# Patient Record
Sex: MALE | Race: WHITE | NOT HISPANIC OR LATINO | Employment: OTHER | URBAN - METROPOLITAN AREA
[De-identification: names, ages, dates, MRNs, and addresses within clinical notes are randomized per-mention and may not be internally consistent; named-entity substitution may affect disease eponyms.]

---

## 2017-01-18 ENCOUNTER — OFFICE VISIT (OUTPATIENT)
Dept: INTERNAL MEDICINE | Facility: CLINIC | Age: 57
End: 2017-01-18

## 2017-01-18 VITALS
HEIGHT: 72 IN | SYSTOLIC BLOOD PRESSURE: 130 MMHG | BODY MASS INDEX: 31.42 KG/M2 | HEART RATE: 68 BPM | DIASTOLIC BLOOD PRESSURE: 76 MMHG | WEIGHT: 232 LBS

## 2017-01-18 DIAGNOSIS — E55.9 VITAMIN D DEFICIENCY: ICD-10-CM

## 2017-01-18 DIAGNOSIS — Z99.89 OSA ON CPAP: ICD-10-CM

## 2017-01-18 DIAGNOSIS — J30.1 SEASONAL ALLERGIC RHINITIS DUE TO POLLEN: ICD-10-CM

## 2017-01-18 DIAGNOSIS — G47.33 OSA ON CPAP: ICD-10-CM

## 2017-01-18 DIAGNOSIS — E78.49 OTHER HYPERLIPIDEMIA: Primary | ICD-10-CM

## 2017-01-18 DIAGNOSIS — M19.90 ARTHRITIS: ICD-10-CM

## 2017-01-18 LAB
25(OH)D3 SERPL-MCNC: 30.2 NG/ML
ALBUMIN SERPL-MCNC: 4.6 G/DL (ref 3.2–4.8)
ALBUMIN/GLOB SERPL: 1.7 G/DL (ref 1.5–2.5)
ALP SERPL-CCNC: 68 U/L (ref 25–100)
ALT SERPL W P-5'-P-CCNC: 34 U/L (ref 7–40)
ANION GAP SERPL CALCULATED.3IONS-SCNC: 6 MMOL/L (ref 3–11)
ARTICHOKE IGE QN: 120 MG/DL (ref 0–130)
AST SERPL-CCNC: 27 U/L (ref 0–33)
BILIRUB SERPL-MCNC: 0.8 MG/DL (ref 0.3–1.2)
BUN BLD-MCNC: 16 MG/DL (ref 9–23)
BUN/CREAT SERPL: 20 (ref 7–25)
CALCIUM SPEC-SCNC: 10.2 MG/DL (ref 8.7–10.4)
CHLORIDE SERPL-SCNC: 105 MMOL/L (ref 99–109)
CHOLEST SERPL-MCNC: 202 MG/DL (ref 0–200)
CO2 SERPL-SCNC: 34 MMOL/L (ref 20–31)
CREAT BLD-MCNC: 0.8 MG/DL (ref 0.6–1.3)
GFR SERPL CREATININE-BSD FRML MDRD: 101 ML/MIN/1.73
GLOBULIN UR ELPH-MCNC: 2.7 GM/DL
GLUCOSE BLD-MCNC: 107 MG/DL (ref 70–100)
HDLC SERPL-MCNC: 42 MG/DL (ref 40–60)
POTASSIUM BLD-SCNC: 4.9 MMOL/L (ref 3.5–5.5)
PROT SERPL-MCNC: 7.3 G/DL (ref 5.7–8.2)
SODIUM BLD-SCNC: 145 MMOL/L (ref 132–146)
TRIGL SERPL-MCNC: 203 MG/DL (ref 0–150)

## 2017-01-18 PROCEDURE — 80061 LIPID PANEL: CPT | Performed by: INTERNAL MEDICINE

## 2017-01-18 PROCEDURE — 82306 VITAMIN D 25 HYDROXY: CPT | Performed by: INTERNAL MEDICINE

## 2017-01-18 PROCEDURE — 80053 COMPREHEN METABOLIC PANEL: CPT | Performed by: INTERNAL MEDICINE

## 2017-01-18 PROCEDURE — 36415 COLL VENOUS BLD VENIPUNCTURE: CPT | Performed by: INTERNAL MEDICINE

## 2017-01-18 PROCEDURE — 99213 OFFICE O/P EST LOW 20 MIN: CPT | Performed by: INTERNAL MEDICINE

## 2017-01-18 NOTE — MR AVS SNAPSHOT
Glen Sousa   2017 7:45 AM   Office Visit    Dept Phone:  [phone]   Encounter #:  [id number]    Provider:  Ender Ibrahim MD   Department:  [facility]                Your Full Care Plan              Your Updated Medication List          This list is accurate as of: 17  8:05 AM.  Always use your most recent med list.                atorvastatin 10 MG tablet   Commonly known as:  LIPITOR       CIALIS 5 MG tablet   Generic drug:  tadalafil   TAKE AS DIRECTED.               We Performed the Following     Comprehensive Metabolic Panel     Lipid Panel     Vitamin D 25 Hydroxy       You Were Diagnosed With        Codes Comments    Other hyperlipidemia    -  Primary ICD-10-CM: E78.4  ICD-9-CM: 272.4     Vitamin D deficiency     ICD-10-CM: E55.9  ICD-9-CM: 268.9     FARIDEH on CPAP     ICD-10-CM: G47.33  ICD-9-CM: 327.23     Arthritis     ICD-10-CM: M19.90  ICD-9-CM: 716.90     Seasonal allergic rhinitis due to pollen     ICD-10-CM: J30.1  ICD-9-CM: 477.0       Instructions     None    Patient Instructions History      Upcoming Appointments     Visit Type Date Time Department    FOLLOW UP 2017  7:45 AM [facility] yoonew Signup     [facility] [employer] allows you to send messages to your doctor, view your test results, renew your prescriptions, schedule appointments, and more. To sign up, go to [employer] and click on the Sign Up Now link in the New User? box. Enter your [employer] Activation Code exactly as it appears below along with the last four digits of your Social Security Number and your Date of Birth () to complete the sign-up process. If you do not sign up before the expiration date, you must request a new code.    [employer] Activation Code: [email or web address]  Expires: 2017  8:05 AM    If you have questions, you can email [email or web address] or call [phone] to talk to our [employer] staff. Remember,  "MyChart is NOT to be used for urgent needs. For medical emergencies, dial 911.               Other Info from Your Visit           Allergies     Niacin      Niacin And Related        Reason for Visit     Hyperlipidemia           Vital Signs     Blood Pressure Pulse Height Weight Body Mass Index Smoking Status    130/76 68 72\" (182.9 cm) 232 lb (105 kg) 31.46 kg/m2 Never Smoker      Problems and Diagnoses Noted     Nasal inflammation due to allergen    Arthritis    High cholesterol or triglycerides    Sleep apnea    Vitamin D deficiency        "

## 2017-01-18 NOTE — PROGRESS NOTES
Patient is a 56 y.o. male who is here for a follow up of chronic condition.  Chief Complaint   Patient presents with   • Hyperlipidemia         HPI:  Here for f/u.  Doing ok. Has diffuse arthritic complaints.  Compliant with cpap.  Allergies are not bothersome.  No abdominal pains.    History:    Patient Active Problem List   Diagnosis   • Hyperlipidemia   • Male erectile disorder   • FARIDEH on CPAP   • Allergic rhinitis   • Arthritis   • Vitamin D deficiency   • Hyperlipoproteinemia type IV   • Routine general medical examination at a health care facility       Past Medical History   Diagnosis Date   • Acute sinusitis      Getting over sinus infection. Symptoms better with augmentin.   • Elbow enthesopathy    • Hyperlipoproteinemia type IV    • Overweight    • Plantar fasciitis    • Vitamin D deficiency        Past Surgical History   Procedure Laterality Date   • Lasik         Current Outpatient Prescriptions on File Prior to Visit   Medication Sig   • atorvastatin (LIPITOR) 10 MG tablet every night.   • CIALIS 5 MG tablet TAKE AS DIRECTED.     No current facility-administered medications on file prior to visit.        Family History   Problem Relation Age of Onset   • Diabetes Other    • Hypertension Other    • Hyperlipidemia Other    • Cancer Other    • Stroke Other    • Arthritis Other    • Graves' disease Other    • Diverticulosis Other      of intestine   • Breast cancer Other        Social History     Social History   • Marital status:      Spouse name: N/A   • Number of children: N/A   • Years of education: N/A     Occupational History   • Not on file.     Social History Main Topics   • Smoking status: Never Smoker   • Smokeless tobacco: Not on file   • Alcohol use Yes      Comment:  · 1 beer weekly   • Drug use: Not on file   • Sexual activity: Not on file     Other Topics Concern   • Not on file     Social History Narrative         ROS:    Review of Systems   Constitutional: Negative for chills,  "fatigue, fever and unexpected weight change.   HENT: Negative for congestion, ear pain, hearing loss, rhinorrhea, sinus pressure, sore throat and trouble swallowing.    Eyes: Negative for discharge and itching.   Respiratory: Negative for cough, chest tightness and shortness of breath.    Cardiovascular: Negative for chest pain, palpitations and leg swelling.   Gastrointestinal: Negative for abdominal pain, blood in stool, constipation, diarrhea and vomiting.        12/13 colonoscopy normal   Endocrine: Negative for polydipsia and polyuria.   Genitourinary: Negative for difficulty urinating, dysuria, enuresis, frequency, hematuria and urgency.        7/16 psa   Musculoskeletal: Positive for arthralgias and back pain. Negative for gait problem and joint swelling.   Skin: Negative for rash and wound.   Allergic/Immunologic: Negative for immunocompromised state.   Neurological: Negative for dizziness, syncope, weakness, light-headedness, numbness and headaches.   Hematological: Does not bruise/bleed easily.   Psychiatric/Behavioral: Negative for behavioral problems, dysphoric mood and sleep disturbance. The patient is not nervous/anxious.        Visit Vitals   • /76   • Pulse 68   • Ht 72\" (182.9 cm)   • Wt 232 lb (105 kg)   • BMI 31.46 kg/m2       Physical Exam:    Physical Exam   Constitutional: He is oriented to person, place, and time. He appears well-developed and well-nourished.   HENT:   Head: Normocephalic and atraumatic.   Right Ear: External ear normal.   Left Ear: External ear normal.   Mouth/Throat: Oropharynx is clear and moist.   Eyes: Conjunctivae and EOM are normal.   Neck: Normal range of motion. Neck supple.   Cardiovascular: Normal rate, regular rhythm and normal heart sounds.    Pulmonary/Chest: Effort normal and breath sounds normal.   Abdominal: Soft. Bowel sounds are normal.   Musculoskeletal: Normal range of motion.   Lymphadenopathy:     He has no cervical adenopathy.   Neurological: He " is alert and oriented to person, place, and time.   Skin: Skin is warm and dry.   Psychiatric: He has a normal mood and affect. His behavior is normal. Thought content normal.       Procedure:      Discussion/Summary:  hyperlipidemia-labs today  tara-cont cpap  rhinitis-advised compliance with flonase  ED-cialis rx prn  Vit d def-recheck  high risk meds-lft today     labs noted and dw patient  , counseled on low carb and ncs      Current Outpatient Prescriptions:   •  atorvastatin (LIPITOR) 10 MG tablet, every night., Disp: , Rfl:   •  CIALIS 5 MG tablet, TAKE AS DIRECTED., Disp: 30 tablet, Rfl: 5        Glen was seen today for hyperlipidemia.    Diagnoses and all orders for this visit:    Other hyperlipidemia  -     Comprehensive Metabolic Panel  -     Lipid Panel    Vitamin D deficiency  -     Vitamin D 25 Hydroxy    TARA on CPAP    Arthritis    Seasonal allergic rhinitis due to pollen

## 2017-03-03 RX ORDER — ATORVASTATIN CALCIUM 10 MG/1
TABLET, FILM COATED ORAL
Qty: 30 TABLET | Refills: 5 | Status: SHIPPED | OUTPATIENT
Start: 2017-03-03 | End: 2017-05-18 | Stop reason: SDUPTHER

## 2017-05-10 RX ORDER — AZITHROMYCIN 250 MG/1
TABLET, FILM COATED ORAL
Qty: 6 TABLET | Refills: 0 | Status: SHIPPED | OUTPATIENT
Start: 2017-05-10 | End: 2017-06-01

## 2017-05-18 RX ORDER — ATORVASTATIN CALCIUM 10 MG/1
10 TABLET, FILM COATED ORAL NIGHTLY
Qty: 90 TABLET | Refills: 1 | Status: SHIPPED | OUTPATIENT
Start: 2017-05-18 | End: 2017-07-18 | Stop reason: SDUPTHER

## 2017-06-01 ENCOUNTER — TELEPHONE (OUTPATIENT)
Dept: INTERNAL MEDICINE | Facility: CLINIC | Age: 57
End: 2017-06-01

## 2017-06-01 RX ORDER — CEFDINIR 300 MG/1
300 CAPSULE ORAL 2 TIMES DAILY
Qty: 20 CAPSULE | Refills: 0 | Status: SHIPPED | OUTPATIENT
Start: 2017-06-01 | End: 2017-07-18

## 2017-06-01 NOTE — TELEPHONE ENCOUNTER
----- Message from Ender Ibrahim MD sent at 6/1/2017  8:18 AM EDT -----  Does he need cxr??  ----- Message -----     From: Sailaja Rico MA     Sent: 6/1/2017   8:05 AM       To: Ender Ibrahim MD    We gave him a zpack on 5/10  ----- Message -----     From: Joanne Godwin     Sent: 6/1/2017   7:56 AM       To: Sailaja Rico MA    Call clarice at [phone]. He cant get well      Pt has sinus infection advised to use mucinex D and will call in antibiotc and to come in if not better with this round of meds

## 2017-07-18 ENCOUNTER — OFFICE VISIT (OUTPATIENT)
Dept: INTERNAL MEDICINE | Facility: CLINIC | Age: 57
End: 2017-07-18

## 2017-07-18 VITALS
HEART RATE: 68 BPM | SYSTOLIC BLOOD PRESSURE: 120 MMHG | DIASTOLIC BLOOD PRESSURE: 74 MMHG | WEIGHT: 233 LBS | BODY MASS INDEX: 31.56 KG/M2 | HEIGHT: 72 IN

## 2017-07-18 DIAGNOSIS — E55.9 VITAMIN D DEFICIENCY: ICD-10-CM

## 2017-07-18 DIAGNOSIS — J30.1 SEASONAL ALLERGIC RHINITIS DUE TO POLLEN: ICD-10-CM

## 2017-07-18 DIAGNOSIS — E78.49 OTHER HYPERLIPIDEMIA: Primary | ICD-10-CM

## 2017-07-18 DIAGNOSIS — Z99.89 OSA ON CPAP: ICD-10-CM

## 2017-07-18 DIAGNOSIS — Z12.5 SCREENING FOR PROSTATE CANCER: ICD-10-CM

## 2017-07-18 DIAGNOSIS — Z00.00 ROUTINE GENERAL MEDICAL EXAMINATION AT A HEALTH CARE FACILITY: ICD-10-CM

## 2017-07-18 DIAGNOSIS — Z12.11 SCREEN FOR COLON CANCER: ICD-10-CM

## 2017-07-18 DIAGNOSIS — M19.90 ARTHRITIS: ICD-10-CM

## 2017-07-18 DIAGNOSIS — G47.33 OSA ON CPAP: ICD-10-CM

## 2017-07-18 LAB
25(OH)D3 SERPL-MCNC: 17.5 NG/ML
ALBUMIN SERPL-MCNC: 4.5 G/DL (ref 3.2–4.8)
ALBUMIN/GLOB SERPL: 1.7 G/DL (ref 1.5–2.5)
ALP SERPL-CCNC: 67 U/L (ref 25–100)
ALT SERPL W P-5'-P-CCNC: 44 U/L (ref 7–40)
ANION GAP SERPL CALCULATED.3IONS-SCNC: 5 MMOL/L (ref 3–11)
ARTICHOKE IGE QN: 116 MG/DL (ref 0–130)
AST SERPL-CCNC: 29 U/L (ref 0–33)
BILIRUB BLD-MCNC: NEGATIVE MG/DL
BILIRUB SERPL-MCNC: 0.9 MG/DL (ref 0.3–1.2)
BUN BLD-MCNC: 14 MG/DL (ref 9–23)
BUN/CREAT SERPL: 17.5 (ref 7–25)
CALCIUM SPEC-SCNC: 9.8 MG/DL (ref 8.7–10.4)
CHLORIDE SERPL-SCNC: 107 MMOL/L (ref 99–109)
CHOLEST SERPL-MCNC: 217 MG/DL (ref 0–200)
CLARITY, POC: CLEAR
CO2 SERPL-SCNC: 27 MMOL/L (ref 20–31)
COLOR UR: YELLOW
CREAT BLD-MCNC: 0.8 MG/DL (ref 0.6–1.3)
DEPRECATED RDW RBC AUTO: 49 FL (ref 37–54)
DEVELOPER EXPIRATION DATE: NORMAL
DEVELOPER LOT NUMBER: NORMAL
ERYTHROCYTE [DISTWIDTH] IN BLOOD BY AUTOMATED COUNT: 13.8 % (ref 11.3–14.5)
EXPIRATION DATE: NORMAL
FECAL OCCULT BLOOD SCREEN, POC: NEGATIVE
GFR SERPL CREATININE-BSD FRML MDRD: 101 ML/MIN/1.73
GLOBULIN UR ELPH-MCNC: 2.6 GM/DL
GLUCOSE BLD-MCNC: 97 MG/DL (ref 70–100)
GLUCOSE UR STRIP-MCNC: NEGATIVE MG/DL
HCT VFR BLD AUTO: 48.7 % (ref 38.9–50.9)
HDLC SERPL-MCNC: 43 MG/DL (ref 40–60)
HGB BLD-MCNC: 16 G/DL (ref 13.1–17.5)
KETONES UR QL: NEGATIVE
LEUKOCYTE EST, POC: NEGATIVE
Lab: 5745
MCH RBC QN AUTO: 32.5 PG (ref 27–31)
MCHC RBC AUTO-ENTMCNC: 32.9 G/DL (ref 32–36)
MCV RBC AUTO: 98.8 FL (ref 80–99)
NEGATIVE CONTROL: NEGATIVE
NITRITE UR-MCNC: NEGATIVE MG/ML
PH UR: 5 [PH] (ref 5–8)
PLATELET # BLD AUTO: 182 10*3/MM3 (ref 150–450)
PMV BLD AUTO: 11.1 FL (ref 6–12)
POSITIVE CONTROL: POSITIVE
POTASSIUM BLD-SCNC: 4.2 MMOL/L (ref 3.5–5.5)
PROT SERPL-MCNC: 7.1 G/DL (ref 5.7–8.2)
PROT UR STRIP-MCNC: NEGATIVE MG/DL
PSA SERPL-MCNC: 2.05 NG/ML (ref 0–4)
RBC # BLD AUTO: 4.93 10*6/MM3 (ref 4.2–5.76)
RBC # UR STRIP: NEGATIVE /UL
SODIUM BLD-SCNC: 139 MMOL/L (ref 132–146)
SP GR UR: 1.02 (ref 1–1.03)
TRIGL SERPL-MCNC: 356 MG/DL (ref 0–150)
TSH SERPL DL<=0.05 MIU/L-ACNC: 4.19 MIU/ML (ref 0.35–5.35)
UROBILINOGEN UR QL: NORMAL
VIT B12 BLD-MCNC: 497 PG/ML (ref 211–911)
WBC NRBC COR # BLD: 6.4 10*3/MM3 (ref 3.5–10.8)

## 2017-07-18 PROCEDURE — 85027 COMPLETE CBC AUTOMATED: CPT | Performed by: INTERNAL MEDICINE

## 2017-07-18 PROCEDURE — 99396 PREV VISIT EST AGE 40-64: CPT | Performed by: INTERNAL MEDICINE

## 2017-07-18 PROCEDURE — 84153 ASSAY OF PSA TOTAL: CPT | Performed by: INTERNAL MEDICINE

## 2017-07-18 PROCEDURE — 80061 LIPID PANEL: CPT | Performed by: INTERNAL MEDICINE

## 2017-07-18 PROCEDURE — 81003 URINALYSIS AUTO W/O SCOPE: CPT | Performed by: INTERNAL MEDICINE

## 2017-07-18 PROCEDURE — 84443 ASSAY THYROID STIM HORMONE: CPT | Performed by: INTERNAL MEDICINE

## 2017-07-18 PROCEDURE — 82306 VITAMIN D 25 HYDROXY: CPT | Performed by: INTERNAL MEDICINE

## 2017-07-18 PROCEDURE — 82270 OCCULT BLOOD FECES: CPT | Performed by: INTERNAL MEDICINE

## 2017-07-18 PROCEDURE — 82607 VITAMIN B-12: CPT | Performed by: INTERNAL MEDICINE

## 2017-07-18 PROCEDURE — 80053 COMPREHEN METABOLIC PANEL: CPT | Performed by: INTERNAL MEDICINE

## 2017-07-18 RX ORDER — ATORVASTATIN CALCIUM 20 MG/1
20 TABLET, FILM COATED ORAL NIGHTLY
Qty: 90 TABLET | Refills: 3 | Status: SHIPPED | OUTPATIENT
Start: 2017-07-18 | End: 2018-07-11 | Stop reason: SDUPTHER

## 2017-07-18 NOTE — PROGRESS NOTES
Patient is a 56 y.o. male who is here for a physical.  Chief Complaint   Patient presents with   • Annual Exam         HPI:  Here for CPE.  Doing good.  Compliant with CPAP.  Getting about 8 hours per night.  Energy level is good.  No CV complaints.  No abdominal pains.  No edema.     History:    Patient Active Problem List   Diagnosis   • Hyperlipidemia   • Male erectile disorder   • FARIDEH on CPAP   • Allergic rhinitis   • Arthritis   • Vitamin D deficiency   • Routine general medical examination at a health care facility       Past Medical History:   Diagnosis Date   • Acute sinusitis     Getting over sinus infection. Symptoms better with augmentin.   • Elbow enthesopathy    • Hyperlipoproteinemia type IV    • Overweight    • Plantar fasciitis    • Vitamin D deficiency        Past Surgical History:   Procedure Laterality Date   • LASIK         Current Outpatient Prescriptions on File Prior to Visit   Medication Sig   • CIALIS 5 MG tablet TAKE AS DIRECTED.     No current facility-administered medications on file prior to visit.        Family History   Problem Relation Age of Onset   • Diabetes Other    • Hypertension Other    • Hyperlipidemia Other    • Cancer Other    • Stroke Other    • Arthritis Other    • Graves' disease Other    • Diverticulosis Other      of intestine   • Breast cancer Other        Social History     Social History   • Marital status:      Spouse name: N/A   • Number of children: N/A   • Years of education: N/A     Occupational History   • Not on file.     Social History Main Topics   • Smoking status: Never Smoker   • Smokeless tobacco: Not on file   • Alcohol use Yes      Comment:  · 1 beer weekly   • Drug use: Not on file   • Sexual activity: Not on file     Other Topics Concern   • Not on file     Social History Narrative         ROS:    Review of Systems   Constitutional: Negative for chills, fatigue, fever and unexpected weight change.   HENT: Negative for congestion, ear pain,  "hearing loss, rhinorrhea, sinus pressure, sore throat and trouble swallowing.    Eyes: Negative for discharge and itching.   Respiratory: Negative for cough, chest tightness and shortness of breath.    Cardiovascular: Negative for chest pain, palpitations and leg swelling.   Gastrointestinal: Negative for abdominal pain, blood in stool, constipation, diarrhea and vomiting.        12/13 colonoscopy normal   Endocrine: Negative for polydipsia and polyuria.   Genitourinary: Negative for difficulty urinating, dysuria, enuresis, frequency, hematuria and urgency.        7/17 psa   Musculoskeletal: Positive for arthralgias and back pain. Negative for gait problem and joint swelling.   Skin: Negative for rash and wound.   Allergic/Immunologic: Negative for immunocompromised state.   Neurological: Negative for dizziness, syncope, weakness, light-headedness, numbness and headaches.   Hematological: Does not bruise/bleed easily.   Psychiatric/Behavioral: Negative for behavioral problems, dysphoric mood and sleep disturbance. The patient is not nervous/anxious.        /74  Pulse 68  Ht 72\" (182.9 cm)  Wt 233 lb (106 kg)  BMI 31.6 kg/m2    Physical Exam:    Physical Exam   Constitutional: He is oriented to person, place, and time. He appears well-developed and well-nourished.   HENT:   Head: Normocephalic and atraumatic.   Right Ear: External ear normal.   Left Ear: External ear normal.   Mouth/Throat: Oropharynx is clear and moist.   Eyes: Conjunctivae and EOM are normal. Pupils are equal, round, and reactive to light.   Neck: Normal range of motion. Neck supple.   Cardiovascular: Normal rate, regular rhythm, normal heart sounds and intact distal pulses.    Pulmonary/Chest: Effort normal and breath sounds normal.   Abdominal: Soft. Bowel sounds are normal.   Genitourinary: Rectum normal and prostate normal. Rectal exam shows guaiac negative stool.   Musculoskeletal: Normal range of motion.   Neurological: He is alert " and oriented to person, place, and time. He has normal reflexes.   Skin: Skin is warm and dry.   Psychiatric: He has a normal mood and affect. His behavior is normal. Judgment and thought content normal.   Vitals reviewed.      Procedure:      Discussion/Summary:  hyperlipidemia-labs today  tara-cont cpap  rhinitis-advised compliance with flonase  ED-cialis rx prn  Vit d def-recheck  high risk meds-lft today  HME-fasting labs today, counseled on diet and exercise  Reviewed the following with the patient: advised patient to avoid alcoholic beverages, encouraged patient to exercise 5-7 days per week for 30 minutes at a time, ideal body weight discussed with patient and weight loss encouraged.        labs noted and dw patient , counseled on low carb and ncs, will increase the lipitor to 20 mg      Current Outpatient Prescriptions:   •  atorvastatin (LIPITOR) 20 MG tablet, Take 1 tablet by mouth Every Night. Needs fasting in 6-8 weeks, Disp: 90 tablet, Rfl: 3  •  CIALIS 5 MG tablet, TAKE AS DIRECTED., Disp: 30 tablet, Rfl: 5        Glen was seen today for annual exam.    Diagnoses and all orders for this visit:    Other hyperlipidemia  -     Lipid Panel    Seasonal allergic rhinitis due to pollen    TARA on CPAP    Vitamin D deficiency    Arthritis    Routine general medical examination at a health care facility  -     CBC (No Diff)  -     Comprehensive Metabolic Panel  -     Lipid Panel  -     PSA  -     TSH  -     Vitamin B12  -     Vitamin D 25 Hydroxy  -     POC Urinalysis Dipstick, Automated  -     POC Occult Blood Stool    Screening for prostate cancer  -     PSA    Screen for colon cancer  -     POC Occult Blood Stool    Other orders  -     atorvastatin (LIPITOR) 20 MG tablet; Take 1 tablet by mouth Every Night. Needs fasting in 6-8 weeks

## 2017-08-31 ENCOUNTER — LAB (OUTPATIENT)
Dept: INTERNAL MEDICINE | Facility: CLINIC | Age: 57
End: 2017-08-31

## 2017-08-31 DIAGNOSIS — E78.00 PURE HYPERCHOLESTEROLEMIA: Primary | ICD-10-CM

## 2017-08-31 LAB
ARTICHOKE IGE QN: 106 MG/DL (ref 0–130)
CHOLEST SERPL-MCNC: 175 MG/DL (ref 0–200)
HDLC SERPL-MCNC: 39 MG/DL (ref 40–60)
TRIGL SERPL-MCNC: 219 MG/DL (ref 0–150)

## 2017-08-31 PROCEDURE — 80061 LIPID PANEL: CPT | Performed by: INTERNAL MEDICINE

## 2018-01-19 ENCOUNTER — OFFICE VISIT (OUTPATIENT)
Dept: INTERNAL MEDICINE | Facility: CLINIC | Age: 58
End: 2018-01-19

## 2018-01-19 VITALS
HEART RATE: 68 BPM | SYSTOLIC BLOOD PRESSURE: 132 MMHG | DIASTOLIC BLOOD PRESSURE: 80 MMHG | BODY MASS INDEX: 32.23 KG/M2 | HEIGHT: 72 IN | WEIGHT: 238 LBS

## 2018-01-19 DIAGNOSIS — Z99.89 OSA ON CPAP: ICD-10-CM

## 2018-01-19 DIAGNOSIS — E78.00 PURE HYPERCHOLESTEROLEMIA: Primary | ICD-10-CM

## 2018-01-19 DIAGNOSIS — J30.1 SEASONAL ALLERGIC RHINITIS DUE TO POLLEN, UNSPECIFIED CHRONICITY: ICD-10-CM

## 2018-01-19 DIAGNOSIS — G47.33 OSA ON CPAP: ICD-10-CM

## 2018-01-19 DIAGNOSIS — M19.90 ARTHRITIS: ICD-10-CM

## 2018-01-19 DIAGNOSIS — E55.9 VITAMIN D DEFICIENCY: ICD-10-CM

## 2018-01-19 LAB
25(OH)D3 SERPL-MCNC: 17.7 NG/ML
ALBUMIN SERPL-MCNC: 4.3 G/DL (ref 3.2–4.8)
ALBUMIN/GLOB SERPL: 1.7 G/DL (ref 1.5–2.5)
ALP SERPL-CCNC: 67 U/L (ref 25–100)
ALT SERPL W P-5'-P-CCNC: 40 U/L (ref 7–40)
ANION GAP SERPL CALCULATED.3IONS-SCNC: 9 MMOL/L (ref 3–11)
ARTICHOKE IGE QN: 149 MG/DL (ref 0–130)
AST SERPL-CCNC: 17 U/L (ref 0–33)
BILIRUB SERPL-MCNC: 0.7 MG/DL (ref 0.3–1.2)
BUN BLD-MCNC: 20 MG/DL (ref 9–23)
BUN/CREAT SERPL: 22.2 (ref 7–25)
CALCIUM SPEC-SCNC: 9.5 MG/DL (ref 8.7–10.4)
CHLORIDE SERPL-SCNC: 104 MMOL/L (ref 99–109)
CHOLEST SERPL-MCNC: 238 MG/DL (ref 0–200)
CO2 SERPL-SCNC: 26 MMOL/L (ref 20–31)
CREAT BLD-MCNC: 0.9 MG/DL (ref 0.6–1.3)
GFR SERPL CREATININE-BSD FRML MDRD: 88 ML/MIN/1.73
GLOBULIN UR ELPH-MCNC: 2.6 GM/DL
GLUCOSE BLD-MCNC: 101 MG/DL (ref 70–100)
HDLC SERPL-MCNC: 37 MG/DL (ref 40–60)
POTASSIUM BLD-SCNC: 4.3 MMOL/L (ref 3.5–5.5)
PROT SERPL-MCNC: 6.9 G/DL (ref 5.7–8.2)
SODIUM BLD-SCNC: 139 MMOL/L (ref 132–146)
TRIGL SERPL-MCNC: 370 MG/DL (ref 0–150)

## 2018-01-19 PROCEDURE — 82306 VITAMIN D 25 HYDROXY: CPT | Performed by: INTERNAL MEDICINE

## 2018-01-19 PROCEDURE — 80061 LIPID PANEL: CPT | Performed by: INTERNAL MEDICINE

## 2018-01-19 PROCEDURE — 36415 COLL VENOUS BLD VENIPUNCTURE: CPT | Performed by: INTERNAL MEDICINE

## 2018-01-19 PROCEDURE — 80053 COMPREHEN METABOLIC PANEL: CPT | Performed by: INTERNAL MEDICINE

## 2018-01-19 PROCEDURE — 99214 OFFICE O/P EST MOD 30 MIN: CPT | Performed by: INTERNAL MEDICINE

## 2018-01-19 RX ORDER — ERGOCALCIFEROL 1.25 MG/1
50000 CAPSULE ORAL WEEKLY
Qty: 12 CAPSULE | Refills: 1 | Status: SHIPPED | OUTPATIENT
Start: 2018-01-19 | End: 2018-07-05 | Stop reason: SDUPTHER

## 2018-01-19 NOTE — PROGRESS NOTES
Patient is a 57 y.o. male who is here for a follow up of chronic conditions.  Chief Complaint   Patient presents with   • Hyperlipidemia         HPI:  Here for f/u.  Doing good.  Difficulty loosing weight.  Trying to loose weight.  No dizziness or lightheadedness.  No abdominal pains.     History:    Patient Active Problem List   Diagnosis   • Hyperlipidemia   • Male erectile disorder   • FARIDEH on CPAP   • Allergic rhinitis   • Arthritis   • Vitamin D deficiency   • Routine general medical examination at a health care facility       Past Medical History:   Diagnosis Date   • Acute sinusitis     Getting over sinus infection. Symptoms better with augmentin.   • Elbow enthesopathy    • Hyperlipoproteinemia type IV    • Overweight    • Plantar fasciitis    • Vitamin D deficiency        Past Surgical History:   Procedure Laterality Date   • LASIK         Current Outpatient Prescriptions on File Prior to Visit   Medication Sig   • atorvastatin (LIPITOR) 20 MG tablet Take 1 tablet by mouth Every Night. Needs fasting in 6-8 weeks   • CIALIS 5 MG tablet TAKE AS DIRECTED.     No current facility-administered medications on file prior to visit.        Family History   Problem Relation Age of Onset   • Diabetes Other    • Hypertension Other    • Hyperlipidemia Other    • Cancer Other    • Stroke Other    • Arthritis Other    • Graves' disease Other    • Diverticulosis Other      of intestine   • Breast cancer Other        Social History     Social History   • Marital status:      Spouse name: N/A   • Number of children: N/A   • Years of education: N/A     Occupational History   • Not on file.     Social History Main Topics   • Smoking status: Never Smoker   • Smokeless tobacco: Not on file   • Alcohol use Yes      Comment:  · 1 beer weekly   • Drug use: Not on file   • Sexual activity: Not on file     Other Topics Concern   • Not on file     Social History Narrative         ROS:    Review of Systems   Constitutional:  "Negative for chills, fatigue, fever and unexpected weight change.   HENT: Negative for congestion, ear pain, hearing loss, rhinorrhea, sinus pressure, sore throat and trouble swallowing.    Eyes: Negative for discharge and itching.   Respiratory: Negative for cough, chest tightness and shortness of breath.    Cardiovascular: Negative for chest pain, palpitations and leg swelling.   Gastrointestinal: Negative for abdominal pain, blood in stool, constipation, diarrhea and vomiting.        12/13 colonoscopy normal   Endocrine: Negative for polydipsia and polyuria.   Genitourinary: Negative for difficulty urinating, dysuria, enuresis, frequency, hematuria and urgency.        7/17 psa   Musculoskeletal: Positive for arthralgias and back pain. Negative for gait problem and joint swelling.   Skin: Negative for rash and wound.   Allergic/Immunologic: Negative for immunocompromised state.   Neurological: Negative for dizziness, syncope, weakness, light-headedness, numbness and headaches.   Hematological: Does not bruise/bleed easily.   Psychiatric/Behavioral: Negative for behavioral problems, dysphoric mood and sleep disturbance. The patient is not nervous/anxious.        /80  Pulse 68  Ht 182.9 cm (72.01\")  Wt 108 kg (238 lb)  BMI 32.27 kg/m2    Physical Exam:    Physical Exam   Constitutional: He is oriented to person, place, and time. He appears well-developed and well-nourished.   HENT:   Head: Normocephalic and atraumatic.   Right Ear: External ear normal.   Left Ear: External ear normal.   Mouth/Throat: Oropharynx is clear and moist.   Eyes: Conjunctivae and EOM are normal. Pupils are equal, round, and reactive to light.   Neck: Normal range of motion. Neck supple.   Cardiovascular: Normal rate, regular rhythm, normal heart sounds and intact distal pulses.    Pulmonary/Chest: Effort normal and breath sounds normal.   Abdominal: Soft. Bowel sounds are normal.   Musculoskeletal: Normal range of motion. "   Neurological: He is alert and oriented to person, place, and time. He has normal reflexes.   Skin: Skin is warm and dry.   Psychiatric: He has a normal mood and affect. His behavior is normal. Judgment and thought content normal.   Vitals reviewed.      Procedure:      Discussion/Summary:    hyperlipidemia-labs today  tara-cont cpap  rhinitis-advised compliance with flonase  ED-cialis rx prn  Vit d def-recheck  high risk meds-lft today    Labs noted and dw patient, he reports has been off statin tx and will restart, also will rx vit d    Current Outpatient Prescriptions:   •  atorvastatin (LIPITOR) 20 MG tablet, Take 1 tablet by mouth Every Night. Needs fasting in 6-8 weeks, Disp: 90 tablet, Rfl: 3  •  CIALIS 5 MG tablet, TAKE AS DIRECTED., Disp: 30 tablet, Rfl: 5  •  vitamin D (ERGOCALCIFEROL) 06115 units capsule capsule, Take 1 capsule by mouth 1 (One) Time Per Week., Disp: 12 capsule, Rfl: 1        Glen was seen today for hyperlipidemia.    Diagnoses and all orders for this visit:    Pure hypercholesterolemia  -     Comprehensive Metabolic Panel  -     Lipid Panel    Seasonal allergic rhinitis due to pollen, unspecified chronicity    TARA on CPAP    Vitamin D deficiency  -     Vitamin D 25 Hydroxy  -     vitamin D (ERGOCALCIFEROL) 38058 units capsule capsule; Take 1 capsule by mouth 1 (One) Time Per Week.    Arthritis

## 2018-07-05 DIAGNOSIS — E55.9 VITAMIN D DEFICIENCY: ICD-10-CM

## 2018-07-05 RX ORDER — ERGOCALCIFEROL 1.25 MG/1
CAPSULE ORAL
Qty: 12 CAPSULE | Refills: 1 | Status: SHIPPED | OUTPATIENT
Start: 2018-07-05 | End: 2019-01-03 | Stop reason: SDUPTHER

## 2018-07-11 RX ORDER — ATORVASTATIN CALCIUM 20 MG/1
20 TABLET, FILM COATED ORAL NIGHTLY
Qty: 90 TABLET | Refills: 3 | Status: SHIPPED | OUTPATIENT
Start: 2018-07-11 | End: 2020-02-03

## 2018-08-02 ENCOUNTER — OFFICE VISIT (OUTPATIENT)
Dept: INTERNAL MEDICINE | Facility: CLINIC | Age: 58
End: 2018-08-02

## 2018-08-02 VITALS
HEIGHT: 72 IN | TEMPERATURE: 97.2 F | BODY MASS INDEX: 30.45 KG/M2 | DIASTOLIC BLOOD PRESSURE: 74 MMHG | WEIGHT: 224.8 LBS | SYSTOLIC BLOOD PRESSURE: 110 MMHG

## 2018-08-02 DIAGNOSIS — Z12.5 SCREENING FOR PROSTATE CANCER: ICD-10-CM

## 2018-08-02 DIAGNOSIS — Z99.89 OSA ON CPAP: ICD-10-CM

## 2018-08-02 DIAGNOSIS — J30.1 SEASONAL ALLERGIC RHINITIS DUE TO POLLEN, UNSPECIFIED CHRONICITY: ICD-10-CM

## 2018-08-02 DIAGNOSIS — E55.9 VITAMIN D DEFICIENCY: ICD-10-CM

## 2018-08-02 DIAGNOSIS — E78.00 PURE HYPERCHOLESTEROLEMIA: Primary | ICD-10-CM

## 2018-08-02 DIAGNOSIS — N52.9 MALE ERECTILE DISORDER: ICD-10-CM

## 2018-08-02 DIAGNOSIS — G47.33 OSA ON CPAP: ICD-10-CM

## 2018-08-02 LAB
ALBUMIN SERPL-MCNC: 4.8 G/DL (ref 3.2–4.8)
ALBUMIN/GLOB SERPL: 2 G/DL (ref 1.5–2.5)
ALP SERPL-CCNC: 71 U/L (ref 25–100)
ALT SERPL W P-5'-P-CCNC: 32 U/L (ref 7–40)
ANION GAP SERPL CALCULATED.3IONS-SCNC: 9 MMOL/L (ref 3–11)
ARTICHOKE IGE QN: 106 MG/DL (ref 0–130)
AST SERPL-CCNC: 22 U/L (ref 0–33)
BILIRUB SERPL-MCNC: 1.1 MG/DL (ref 0.3–1.2)
BUN BLD-MCNC: 16 MG/DL (ref 9–23)
BUN/CREAT SERPL: 16.8 (ref 7–25)
CALCIUM SPEC-SCNC: 10 MG/DL (ref 8.7–10.4)
CHLORIDE SERPL-SCNC: 103 MMOL/L (ref 99–109)
CHOLEST SERPL-MCNC: 173 MG/DL (ref 0–200)
CO2 SERPL-SCNC: 32 MMOL/L (ref 20–31)
CREAT BLD-MCNC: 0.95 MG/DL (ref 0.6–1.3)
DEPRECATED RDW RBC AUTO: 46.6 FL (ref 37–54)
ERYTHROCYTE [DISTWIDTH] IN BLOOD BY AUTOMATED COUNT: 13.3 % (ref 11.3–14.5)
GFR SERPL CREATININE-BSD FRML MDRD: 82 ML/MIN/1.73
GLOBULIN UR ELPH-MCNC: 2.4 GM/DL
GLUCOSE BLD-MCNC: 98 MG/DL (ref 70–100)
HCT VFR BLD AUTO: 50.8 % (ref 38.9–50.9)
HDLC SERPL-MCNC: 42 MG/DL (ref 40–60)
HGB BLD-MCNC: 17.3 G/DL (ref 13.1–17.5)
MCH RBC QN AUTO: 32.7 PG (ref 27–31)
MCHC RBC AUTO-ENTMCNC: 34.1 G/DL (ref 32–36)
MCV RBC AUTO: 96 FL (ref 80–99)
PLATELET # BLD AUTO: 192 10*3/MM3 (ref 150–450)
PMV BLD AUTO: 11.6 FL (ref 6–12)
POTASSIUM BLD-SCNC: 4.8 MMOL/L (ref 3.5–5.5)
PROT SERPL-MCNC: 7.2 G/DL (ref 5.7–8.2)
PSA SERPL-MCNC: 2.52 NG/ML (ref 0–4)
RBC # BLD AUTO: 5.29 10*6/MM3 (ref 4.2–5.76)
SODIUM BLD-SCNC: 144 MMOL/L (ref 132–146)
TRIGL SERPL-MCNC: 230 MG/DL (ref 0–150)
WBC NRBC COR # BLD: 6.77 10*3/MM3 (ref 3.5–10.8)

## 2018-08-02 PROCEDURE — 80053 COMPREHEN METABOLIC PANEL: CPT | Performed by: INTERNAL MEDICINE

## 2018-08-02 PROCEDURE — 80061 LIPID PANEL: CPT | Performed by: INTERNAL MEDICINE

## 2018-08-02 PROCEDURE — 85027 COMPLETE CBC AUTOMATED: CPT | Performed by: INTERNAL MEDICINE

## 2018-08-02 PROCEDURE — 99214 OFFICE O/P EST MOD 30 MIN: CPT | Performed by: INTERNAL MEDICINE

## 2018-08-02 PROCEDURE — G0103 PSA SCREENING: HCPCS | Performed by: INTERNAL MEDICINE

## 2018-08-02 RX ORDER — SILDENAFIL CITRATE 20 MG/1
TABLET ORAL
Qty: 90 TABLET | Refills: 5 | Status: SHIPPED | OUTPATIENT
Start: 2018-08-02 | End: 2021-01-15 | Stop reason: SDUPTHER

## 2018-08-02 NOTE — PROGRESS NOTES
Patient is a 57 y.o. male who is here for a follow up of chronic medical conditions.   Chief Complaint   Patient presents with   • Hyperlipidemia         HPI:    Here for f/u.  Doing ok.  Back on statin.  No nausea or emesis.  No myalgia.  Appetite is good.  Sleeping well.    History:    Patient Active Problem List   Diagnosis   • Hyperlipidemia   • Male erectile disorder   • FARIDEH on CPAP   • Allergic rhinitis   • Arthritis   • Vitamin D deficiency   • Routine general medical examination at a health care facility       Past Medical History:   Diagnosis Date   • Acute sinusitis     Getting over sinus infection. Symptoms better with augmentin.   • Elbow enthesopathy    • Hyperlipoproteinemia type IV    • Overweight    • Plantar fasciitis    • Vitamin D deficiency        Past Surgical History:   Procedure Laterality Date   • LASIK         Current Outpatient Prescriptions on File Prior to Visit   Medication Sig   • atorvastatin (LIPITOR) 20 MG tablet TAKE 1 TABLET BY MOUTH EVERY NIGHT. NEEDS FASTING IN 6-8 WEEKS   • CIALIS 5 MG tablet TAKE AS DIRECTED.   • vitamin D (ERGOCALCIFEROL) 55331 units capsule capsule TAKE ONE CAPSULE BY MOUTH WEEKLY     No current facility-administered medications on file prior to visit.        Family History   Problem Relation Age of Onset   • Diabetes Other    • Hypertension Other    • Hyperlipidemia Other    • Cancer Other    • Stroke Other    • Arthritis Other    • Graves' disease Other    • Diverticulosis Other         of intestine   • Breast cancer Other        Social History     Social History   • Marital status:      Spouse name: N/A   • Number of children: N/A   • Years of education: N/A     Occupational History   • Not on file.     Social History Main Topics   • Smoking status: Never Smoker   • Smokeless tobacco: Not on file   • Alcohol use Yes      Comment:  · 1 beer weekly   • Drug use: Unknown   • Sexual activity: Not on file     Other Topics Concern   • Not on file  "    Social History Narrative   • No narrative on file         Review of Systems   Constitutional: Negative for chills, fatigue, fever and unexpected weight change.   HENT: Negative for congestion, ear pain, hearing loss, rhinorrhea, sinus pressure, sore throat and trouble swallowing.    Eyes: Negative for discharge and itching.   Respiratory: Negative for cough, chest tightness and shortness of breath.    Cardiovascular: Negative for chest pain, palpitations and leg swelling.   Gastrointestinal: Negative for abdominal pain, blood in stool, constipation, diarrhea and vomiting.        12/13 colonoscopy normal   Endocrine: Negative for polydipsia and polyuria.   Genitourinary: Negative for difficulty urinating, dysuria, enuresis, frequency, hematuria and urgency.        8/18 psa   Musculoskeletal: Positive for arthralgias and back pain. Negative for gait problem and joint swelling.   Skin: Negative for rash and wound.   Allergic/Immunologic: Negative for immunocompromised state.   Neurological: Negative for dizziness, syncope, weakness, light-headedness, numbness and headaches.   Hematological: Does not bruise/bleed easily.   Psychiatric/Behavioral: Negative for behavioral problems, dysphoric mood and sleep disturbance. The patient is not nervous/anxious.        /74   Temp 97.2 °F (36.2 °C)   Ht 182.9 cm (72\")   Wt 102 kg (224 lb 12.8 oz)   BMI 30.49 kg/m²       Physical Exam   Constitutional: He is oriented to person, place, and time. He appears well-developed and well-nourished.   HENT:   Head: Normocephalic and atraumatic.   Right Ear: External ear normal.   Left Ear: External ear normal.   Mouth/Throat: Oropharynx is clear and moist.   Eyes: Pupils are equal, round, and reactive to light. Conjunctivae and EOM are normal.   Neck: Normal range of motion. Neck supple.   Cardiovascular: Normal rate, regular rhythm, normal heart sounds and intact distal pulses.    Pulmonary/Chest: Effort normal and breath " sounds normal.   Abdominal: Soft. Bowel sounds are normal.   Musculoskeletal: Normal range of motion.   Neurological: He is alert and oriented to person, place, and time. He has normal reflexes.   Skin: Skin is warm and dry.   Psychiatric: He has a normal mood and affect. His behavior is normal. Judgment and thought content normal.   Vitals reviewed.      Procedure:      Discussion/Summary:    hyperlipidemia-labs today  tara-cont cpap  rhinitis-advised compliance with flonase  ED-trial of sildenafil  Vit d def-recheck  high risk meds-lft today     Labs noted and dw patient    Current Outpatient Prescriptions:   •  atorvastatin (LIPITOR) 20 MG tablet, TAKE 1 TABLET BY MOUTH EVERY NIGHT. NEEDS FASTING IN 6-8 WEEKS, Disp: 90 tablet, Rfl: 3  •  CIALIS 5 MG tablet, TAKE AS DIRECTED., Disp: 30 tablet, Rfl: 5  •  vitamin D (ERGOCALCIFEROL) 58807 units capsule capsule, TAKE ONE CAPSULE BY MOUTH WEEKLY, Disp: 12 capsule, Rfl: 1  •  sildenafil (REVATIO) 20 MG tablet, 2-4 tabs as needed, Disp: 90 tablet, Rfl: 5        Glen was seen today for hyperlipidemia.    Diagnoses and all orders for this visit:    Pure hypercholesterolemia  -     Comprehensive Metabolic Panel  -     Lipid Panel    Seasonal allergic rhinitis due to pollen, unspecified chronicity    TARA on CPAP  -     CBC (No Diff)    Vitamin D deficiency    Male erectile disorder  -     sildenafil (REVATIO) 20 MG tablet; 2-4 tabs as needed    Screening for prostate cancer  -     PSA Screen

## 2018-12-31 ENCOUNTER — OFFICE VISIT (OUTPATIENT)
Dept: INTERNAL MEDICINE | Facility: CLINIC | Age: 58
End: 2018-12-31

## 2018-12-31 VITALS
DIASTOLIC BLOOD PRESSURE: 80 MMHG | HEIGHT: 72 IN | WEIGHT: 235 LBS | BODY MASS INDEX: 31.83 KG/M2 | SYSTOLIC BLOOD PRESSURE: 124 MMHG | TEMPERATURE: 98.6 F

## 2018-12-31 DIAGNOSIS — M19.90 ARTHRITIS: ICD-10-CM

## 2018-12-31 DIAGNOSIS — J30.1 SEASONAL ALLERGIC RHINITIS DUE TO POLLEN: ICD-10-CM

## 2018-12-31 DIAGNOSIS — J01.11 ACUTE RECURRENT FRONTAL SINUSITIS: ICD-10-CM

## 2018-12-31 DIAGNOSIS — E55.9 VITAMIN D DEFICIENCY: ICD-10-CM

## 2018-12-31 DIAGNOSIS — G47.33 OSA ON CPAP: ICD-10-CM

## 2018-12-31 DIAGNOSIS — E78.2 MIXED HYPERLIPIDEMIA: Primary | ICD-10-CM

## 2018-12-31 DIAGNOSIS — Z99.89 OSA ON CPAP: ICD-10-CM

## 2018-12-31 PROCEDURE — 99214 OFFICE O/P EST MOD 30 MIN: CPT | Performed by: INTERNAL MEDICINE

## 2018-12-31 RX ORDER — CEFDINIR 300 MG/1
300 CAPSULE ORAL 2 TIMES DAILY
Qty: 20 CAPSULE | Refills: 0 | Status: SHIPPED | OUTPATIENT
Start: 2018-12-31 | End: 2019-02-04

## 2018-12-31 NOTE — PROGRESS NOTES
Patient is a 58 y.o. male who is here for a follow up of   Chief Complaint   Patient presents with   • URI         HPI:    Here for f/u.  Patient c/o several week history of head congestion and cough.  No fever or chills.  Gets usual sinus infection this time of year.  Some tinnitus.  No SOB.  No hemoptysis.  No abdominal pains.     History:    Patient Active Problem List   Diagnosis   • Hyperlipidemia   • Male erectile disorder   • FARIDEH on CPAP   • Allergic rhinitis   • Arthritis   • Vitamin D deficiency   • Routine general medical examination at a health care facility   • Acute recurrent frontal sinusitis       Past Medical History:   Diagnosis Date   • Acute sinusitis     Getting over sinus infection. Symptoms better with augmentin.   • Elbow enthesopathy    • Hyperlipoproteinemia type IV    • Overweight    • Plantar fasciitis    • Vitamin D deficiency        Past Surgical History:   Procedure Laterality Date   • LASIK         Current Outpatient Medications on File Prior to Visit   Medication Sig   • atorvastatin (LIPITOR) 20 MG tablet TAKE 1 TABLET BY MOUTH EVERY NIGHT. NEEDS FASTING IN 6-8 WEEKS   • CIALIS 5 MG tablet TAKE AS DIRECTED.   • sildenafil (REVATIO) 20 MG tablet 2-4 tabs as needed   • vitamin D (ERGOCALCIFEROL) 33646 units capsule capsule TAKE ONE CAPSULE BY MOUTH WEEKLY     No current facility-administered medications on file prior to visit.        Family History   Problem Relation Age of Onset   • Diabetes Other    • Hypertension Other    • Hyperlipidemia Other    • Cancer Other    • Stroke Other    • Arthritis Other    • Graves' disease Other    • Diverticulosis Other         of intestine   • Breast cancer Other        Social History     Socioeconomic History   • Marital status:      Spouse name: Not on file   • Number of children: Not on file   • Years of education: Not on file   • Highest education level: Not on file   Social Needs   • Financial resource strain: Not on file   • Food  "insecurity - worry: Not on file   • Food insecurity - inability: Not on file   • Transportation needs - medical: Not on file   • Transportation needs - non-medical: Not on file   Occupational History   • Not on file   Tobacco Use   • Smoking status: Never Smoker   Substance and Sexual Activity   • Alcohol use: Yes     Comment:  · 1 beer weekly   • Drug use: Not on file   • Sexual activity: Not on file   Other Topics Concern   • Not on file   Social History Narrative   • Not on file         Review of Systems   Constitutional: Negative for chills, fatigue, fever and unexpected weight change.   HENT: Positive for congestion, rhinorrhea, sinus pressure and sinus pain. Negative for ear pain, hearing loss, sore throat and trouble swallowing.    Eyes: Negative for discharge and itching.   Respiratory: Negative for cough, chest tightness and shortness of breath.    Cardiovascular: Negative for chest pain, palpitations and leg swelling.   Gastrointestinal: Negative for abdominal pain, blood in stool, constipation, diarrhea and vomiting.        12/13 colonoscopy normal   Endocrine: Negative for polydipsia and polyuria.   Genitourinary: Negative for difficulty urinating, dysuria, enuresis, frequency, hematuria and urgency.        8/18 psa   Musculoskeletal: Positive for arthralgias and back pain. Negative for gait problem and joint swelling.   Skin: Negative for rash and wound.   Allergic/Immunologic: Negative for immunocompromised state.   Neurological: Negative for dizziness, syncope, weakness, light-headedness, numbness and headaches.   Hematological: Does not bruise/bleed easily.   Psychiatric/Behavioral: Negative for behavioral problems, dysphoric mood and sleep disturbance. The patient is not nervous/anxious.        /80 (BP Location: Left arm, Patient Position: Sitting, Cuff Size: Large Adult)   Temp 98.6 °F (37 °C) (Temporal)   Ht 182.9 cm (72\")   Wt 107 kg (235 lb)   BMI 31.87 kg/m²       Physical Exam "   Constitutional: He is oriented to person, place, and time. He appears well-developed and well-nourished.   HENT:   Head: Normocephalic and atraumatic.   Right Ear: External ear normal.   Left Ear: External ear normal.   Mouth/Throat: Oropharynx is clear and moist.   Frontal tenderness   Eyes: Conjunctivae and EOM are normal. Pupils are equal, round, and reactive to light.   Neck: Normal range of motion. Neck supple.   Cardiovascular: Normal rate, regular rhythm, normal heart sounds and intact distal pulses.   Pulmonary/Chest: Effort normal and breath sounds normal.   Abdominal: Soft. Bowel sounds are normal.   Musculoskeletal: Normal range of motion.   Neurological: He is alert and oriented to person, place, and time. He has normal reflexes.   Skin: Skin is warm and dry.   Psychiatric: He has a normal mood and affect. His behavior is normal. Judgment and thought content normal.   Vitals reviewed.      Procedure:      Discussion/Summary:    hyperlipidemia-labs noted, counseled on diet  tara-cont cpap  rhinitis-advised compliance with flonase and prn allegra  ED-trial of sildenafil  Vit d def-recheck on rtc  Sinusitis-rx omnicef  high risk meds-lft noted     Labs noted and dw patient        Current Outpatient Medications:   •  atorvastatin (LIPITOR) 20 MG tablet, TAKE 1 TABLET BY MOUTH EVERY NIGHT. NEEDS FASTING IN 6-8 WEEKS, Disp: 90 tablet, Rfl: 3  •  CIALIS 5 MG tablet, TAKE AS DIRECTED., Disp: 30 tablet, Rfl: 5  •  sildenafil (REVATIO) 20 MG tablet, 2-4 tabs as needed, Disp: 90 tablet, Rfl: 5  •  vitamin D (ERGOCALCIFEROL) 23072 units capsule capsule, TAKE ONE CAPSULE BY MOUTH WEEKLY, Disp: 12 capsule, Rfl: 1  •  cefdinir (OMNICEF) 300 MG capsule, Take 1 capsule by mouth 2 (Two) Times a Day., Disp: 20 capsule, Rfl: 0        Glen was seen today for uri.    Diagnoses and all orders for this visit:    Mixed hyperlipidemia    TARA on CPAP    Seasonal allergic rhinitis due to pollen    Acute recurrent frontal  sinusitis  -     cefdinir (OMNICEF) 300 MG capsule; Take 1 capsule by mouth 2 (Two) Times a Day.    Vitamin D deficiency    Arthritis

## 2019-01-03 DIAGNOSIS — E55.9 VITAMIN D DEFICIENCY: ICD-10-CM

## 2019-01-03 RX ORDER — ERGOCALCIFEROL 1.25 MG/1
CAPSULE ORAL
Qty: 12 CAPSULE | Refills: 1 | Status: SHIPPED | OUTPATIENT
Start: 2019-01-03 | End: 2019-08-20 | Stop reason: SDUPTHER

## 2019-02-04 ENCOUNTER — OFFICE VISIT (OUTPATIENT)
Dept: INTERNAL MEDICINE | Facility: CLINIC | Age: 59
End: 2019-02-04

## 2019-02-04 VITALS
BODY MASS INDEX: 31.69 KG/M2 | WEIGHT: 234 LBS | SYSTOLIC BLOOD PRESSURE: 120 MMHG | DIASTOLIC BLOOD PRESSURE: 70 MMHG | HEART RATE: 68 BPM | HEIGHT: 72 IN

## 2019-02-04 DIAGNOSIS — M19.90 ARTHRITIS: ICD-10-CM

## 2019-02-04 DIAGNOSIS — E55.9 VITAMIN D DEFICIENCY: ICD-10-CM

## 2019-02-04 DIAGNOSIS — E78.2 MIXED HYPERLIPIDEMIA: Primary | ICD-10-CM

## 2019-02-04 DIAGNOSIS — G47.33 OSA ON CPAP: ICD-10-CM

## 2019-02-04 DIAGNOSIS — J30.1 SEASONAL ALLERGIC RHINITIS DUE TO POLLEN: ICD-10-CM

## 2019-02-04 DIAGNOSIS — Z99.89 OSA ON CPAP: ICD-10-CM

## 2019-02-04 PROBLEM — J01.11 ACUTE RECURRENT FRONTAL SINUSITIS: Status: RESOLVED | Noted: 2018-12-31 | Resolved: 2019-02-04

## 2019-02-04 LAB
25(OH)D3 SERPL-MCNC: 43.3 NG/ML
ALBUMIN SERPL-MCNC: 4.27 G/DL (ref 3.2–4.8)
ALBUMIN/GLOB SERPL: 2.3 G/DL (ref 1.5–2.5)
ALP SERPL-CCNC: 70 U/L (ref 25–100)
ALT SERPL W P-5'-P-CCNC: 25 U/L (ref 7–40)
ANION GAP SERPL CALCULATED.3IONS-SCNC: 5 MMOL/L (ref 3–11)
ARTICHOKE IGE QN: 85 MG/DL (ref 0–130)
AST SERPL-CCNC: 20 U/L (ref 0–33)
BILIRUB SERPL-MCNC: 1 MG/DL (ref 0.3–1.2)
BUN BLD-MCNC: 17 MG/DL (ref 9–23)
BUN/CREAT SERPL: 19.1 (ref 7–25)
CALCIUM SPEC-SCNC: 9.1 MG/DL (ref 8.7–10.4)
CHLORIDE SERPL-SCNC: 105 MMOL/L (ref 99–109)
CHOLEST SERPL-MCNC: 140 MG/DL (ref 0–200)
CO2 SERPL-SCNC: 28 MMOL/L (ref 20–31)
CREAT BLD-MCNC: 0.89 MG/DL (ref 0.6–1.3)
GFR SERPL CREATININE-BSD FRML MDRD: 88 ML/MIN/1.73
GLOBULIN UR ELPH-MCNC: 1.8 GM/DL
GLUCOSE BLD-MCNC: 96 MG/DL (ref 70–100)
HDLC SERPL-MCNC: 32 MG/DL (ref 40–60)
POTASSIUM BLD-SCNC: 4.2 MMOL/L (ref 3.5–5.5)
PROT SERPL-MCNC: 6.1 G/DL (ref 5.7–8.2)
SODIUM BLD-SCNC: 138 MMOL/L (ref 132–146)
TRIGL SERPL-MCNC: 233 MG/DL (ref 0–150)

## 2019-02-04 PROCEDURE — 80053 COMPREHEN METABOLIC PANEL: CPT | Performed by: INTERNAL MEDICINE

## 2019-02-04 PROCEDURE — 80061 LIPID PANEL: CPT | Performed by: INTERNAL MEDICINE

## 2019-02-04 PROCEDURE — 82306 VITAMIN D 25 HYDROXY: CPT | Performed by: INTERNAL MEDICINE

## 2019-02-04 PROCEDURE — 99214 OFFICE O/P EST MOD 30 MIN: CPT | Performed by: INTERNAL MEDICINE

## 2019-02-04 NOTE — PROGRESS NOTES
Patient is a 58 y.o. male who is here for a follow up of chronic conditions.  Chief Complaint   Patient presents with   • Hyperlipidemia         HPI:    Here for f/u.  Patient with occasional nasal congestion.  Energy level is good.  Compliant with CPAP.   No dizziness or lightheadedness.  No abdominal pains.     History:    Patient Active Problem List   Diagnosis   • Hyperlipidemia   • Male erectile disorder   • FARIDEH on CPAP   • Allergic rhinitis   • Arthritis   • Vitamin D deficiency   • Routine general medical examination at a health care facility       Past Medical History:   Diagnosis Date   • Acute sinusitis     Getting over sinus infection. Symptoms better with augmentin.   • Elbow enthesopathy    • Hyperlipoproteinemia type IV    • Overweight    • Plantar fasciitis    • Vitamin D deficiency        Past Surgical History:   Procedure Laterality Date   • LASIK         Current Outpatient Medications on File Prior to Visit   Medication Sig   • atorvastatin (LIPITOR) 20 MG tablet TAKE 1 TABLET BY MOUTH EVERY NIGHT. NEEDS FASTING IN 6-8 WEEKS   • CIALIS 5 MG tablet TAKE AS DIRECTED.   • sildenafil (REVATIO) 20 MG tablet 2-4 tabs as needed   • vitamin D (ERGOCALCIFEROL) 61241 units capsule capsule TAKE ONE CAPSULE BY MOUTH WEEKLY   • [DISCONTINUED] cefdinir (OMNICEF) 300 MG capsule Take 1 capsule by mouth 2 (Two) Times a Day.     No current facility-administered medications on file prior to visit.        Family History   Problem Relation Age of Onset   • Diabetes Other    • Hypertension Other    • Hyperlipidemia Other    • Cancer Other    • Stroke Other    • Arthritis Other    • Graves' disease Other    • Diverticulosis Other         of intestine   • Breast cancer Other        Social History     Socioeconomic History   • Marital status:      Spouse name: Not on file   • Number of children: Not on file   • Years of education: Not on file   • Highest education level: Not on file   Social Needs   • Financial  "resource strain: Not on file   • Food insecurity - worry: Not on file   • Food insecurity - inability: Not on file   • Transportation needs - medical: Not on file   • Transportation needs - non-medical: Not on file   Occupational History   • Not on file   Tobacco Use   • Smoking status: Never Smoker   Substance and Sexual Activity   • Alcohol use: Yes     Comment:  · 1 beer weekly   • Drug use: Not on file   • Sexual activity: Not on file   Other Topics Concern   • Not on file   Social History Narrative   • Not on file         Review of Systems   Constitutional: Negative for chills, fatigue, fever and unexpected weight change.   HENT: Negative for ear pain, hearing loss, sore throat and trouble swallowing.    Eyes: Negative for discharge and itching.   Respiratory: Negative for cough, chest tightness and shortness of breath.    Cardiovascular: Negative for chest pain, palpitations and leg swelling.   Gastrointestinal: Negative for abdominal pain, blood in stool, constipation, diarrhea and vomiting.        12/13 colonoscopy normal   Endocrine: Negative for polydipsia and polyuria.   Genitourinary: Negative for difficulty urinating, dysuria, enuresis, frequency, hematuria and urgency.        8/18 psa   Musculoskeletal: Positive for arthralgias and back pain. Negative for gait problem and joint swelling.   Skin: Negative for rash and wound.   Allergic/Immunologic: Negative for immunocompromised state.   Neurological: Negative for dizziness, syncope, weakness, light-headedness, numbness and headaches.   Hematological: Does not bruise/bleed easily.   Psychiatric/Behavioral: Negative for behavioral problems, dysphoric mood and sleep disturbance. The patient is not nervous/anxious.        /70   Pulse 68   Ht 182.9 cm (72.01\")   Wt 106 kg (234 lb)   BMI 31.73 kg/m²       Physical Exam   Constitutional: He is oriented to person, place, and time. He appears well-developed and well-nourished.   HENT:   Head: " Normocephalic and atraumatic.   Right Ear: External ear normal.   Left Ear: External ear normal.   Mouth/Throat: Oropharynx is clear and moist.   Eyes: Conjunctivae and EOM are normal. Pupils are equal, round, and reactive to light.   Neck: Normal range of motion. Neck supple.   Cardiovascular: Normal rate, regular rhythm, normal heart sounds and intact distal pulses.   Pulmonary/Chest: Effort normal and breath sounds normal.   Abdominal: Soft. Bowel sounds are normal.   Musculoskeletal: Normal range of motion.   Neurological: He is alert and oriented to person, place, and time. He has normal reflexes.   Skin: Skin is warm and dry.   Psychiatric: He has a normal mood and affect. His behavior is normal. Judgment and thought content normal.   Vitals reviewed.      Procedure:      Discussion/Summary:    hyperlipidemia-labs noted, counseled on diet  tara-cont cpap  rhinitis-advised compliance with flonase and prn allegra  ED-trial of sildenafil  Vit d def-recheck   high risk meds-lft today     Labs noted and dw patient, counseled on diet and exercise        Current Outpatient Medications:   •  atorvastatin (LIPITOR) 20 MG tablet, TAKE 1 TABLET BY MOUTH EVERY NIGHT. NEEDS FASTING IN 6-8 WEEKS, Disp: 90 tablet, Rfl: 3  •  CIALIS 5 MG tablet, TAKE AS DIRECTED., Disp: 30 tablet, Rfl: 5  •  sildenafil (REVATIO) 20 MG tablet, 2-4 tabs as needed, Disp: 90 tablet, Rfl: 5  •  vitamin D (ERGOCALCIFEROL) 50263 units capsule capsule, TAKE ONE CAPSULE BY MOUTH WEEKLY, Disp: 12 capsule, Rfl: 1        Glen was seen today for hyperlipidemia.    Diagnoses and all orders for this visit:    Mixed hyperlipidemia  -     Comprehensive Metabolic Panel  -     Lipid Panel    TARA on CPAP    Seasonal allergic rhinitis due to pollen    Vitamin D deficiency  -     Vitamin D 25 Hydroxy    Arthritis

## 2019-03-11 ENCOUNTER — TELEPHONE (OUTPATIENT)
Dept: INTERNAL MEDICINE | Facility: CLINIC | Age: 59
End: 2019-03-11

## 2019-03-11 RX ORDER — CEFDINIR 300 MG/1
300 CAPSULE ORAL 2 TIMES DAILY
Qty: 20 CAPSULE | Refills: 0 | Status: SHIPPED | OUTPATIENT
Start: 2019-03-11 | End: 2019-08-20

## 2019-03-11 NOTE — TELEPHONE ENCOUNTER
Pt is wants to no if dr garcia can call him in a antibiotic for a sinus infection. Pt said that he wants the same one that he called in the fall because it worked really well. [facility] is [facility]. Please call the pt back

## 2019-03-22 ENCOUNTER — TELEPHONE (OUTPATIENT)
Dept: INTERNAL MEDICINE | Facility: CLINIC | Age: 59
End: 2019-03-22

## 2019-03-22 RX ORDER — METHYLPREDNISOLONE 4 MG/1
TABLET ORAL
Qty: 21 TABLET | Refills: 0 | Status: SHIPPED | OUTPATIENT
Start: 2019-03-22 | End: 2019-08-20

## 2019-03-22 NOTE — TELEPHONE ENCOUNTER
Patient called said he finished his antibiotics and he is still not feeling well and wanted to know if he needs to be seen or if you can call him something else in? Please give pt a call.

## 2019-08-20 ENCOUNTER — OFFICE VISIT (OUTPATIENT)
Dept: INTERNAL MEDICINE | Facility: CLINIC | Age: 59
End: 2019-08-20

## 2019-08-20 VITALS
DIASTOLIC BLOOD PRESSURE: 82 MMHG | WEIGHT: 227 LBS | OXYGEN SATURATION: 95 % | HEART RATE: 73 BPM | SYSTOLIC BLOOD PRESSURE: 120 MMHG | BODY MASS INDEX: 30.75 KG/M2 | RESPIRATION RATE: 20 BRPM | HEIGHT: 72 IN | TEMPERATURE: 98.5 F

## 2019-08-20 DIAGNOSIS — Z12.5 SCREENING FOR PROSTATE CANCER: ICD-10-CM

## 2019-08-20 DIAGNOSIS — Z13.21 ENCOUNTER FOR VITAMIN DEFICIENCY SCREENING: ICD-10-CM

## 2019-08-20 DIAGNOSIS — Z00.00 WELLNESS EXAMINATION: Primary | ICD-10-CM

## 2019-08-20 DIAGNOSIS — E55.9 VITAMIN D DEFICIENCY: ICD-10-CM

## 2019-08-20 DIAGNOSIS — Z13.0 SCREENING FOR BLOOD DISEASE: ICD-10-CM

## 2019-08-20 DIAGNOSIS — Z13.29 THYROID DISORDER SCREENING: ICD-10-CM

## 2019-08-20 DIAGNOSIS — Z13.220 LIPID SCREENING: ICD-10-CM

## 2019-08-20 LAB
25(OH)D3 SERPL-MCNC: 35.8 NG/ML (ref 30–100)
ALBUMIN SERPL-MCNC: 4.9 G/DL (ref 3.5–5.2)
ALBUMIN/GLOB SERPL: 2.2 G/DL
ALP SERPL-CCNC: 71 U/L (ref 39–117)
ALT SERPL W P-5'-P-CCNC: 26 U/L (ref 1–41)
ANION GAP SERPL CALCULATED.3IONS-SCNC: 12.4 MMOL/L (ref 5–15)
AST SERPL-CCNC: 26 U/L (ref 1–40)
BASOPHILS # BLD AUTO: 0.02 10*3/MM3 (ref 0–0.2)
BASOPHILS NFR BLD AUTO: 0.3 % (ref 0–1.5)
BILIRUB SERPL-MCNC: 0.9 MG/DL (ref 0.2–1.2)
BUN BLD-MCNC: 16 MG/DL (ref 6–20)
BUN/CREAT SERPL: 17 (ref 7–25)
CALCIUM SPEC-SCNC: 9.8 MG/DL (ref 8.6–10.5)
CHLORIDE SERPL-SCNC: 96 MMOL/L (ref 98–107)
CHOLEST SERPL-MCNC: 199 MG/DL (ref 0–200)
CO2 SERPL-SCNC: 27.6 MMOL/L (ref 22–29)
CREAT BLD-MCNC: 0.94 MG/DL (ref 0.76–1.27)
DEPRECATED RDW RBC AUTO: 46.5 FL (ref 37–54)
EOSINOPHIL # BLD AUTO: 0.11 10*3/MM3 (ref 0–0.4)
EOSINOPHIL NFR BLD AUTO: 1.9 % (ref 0.3–6.2)
ERYTHROCYTE [DISTWIDTH] IN BLOOD BY AUTOMATED COUNT: 12.9 % (ref 12.3–15.4)
FOLATE SERPL-MCNC: >20 NG/ML (ref 4.78–24.2)
GFR SERPL CREATININE-BSD FRML MDRD: 83 ML/MIN/1.73
GLOBULIN UR ELPH-MCNC: 2.2 GM/DL
GLUCOSE BLD-MCNC: 86 MG/DL (ref 65–99)
HCT VFR BLD AUTO: 52 % (ref 37.5–51)
HDLC SERPL-MCNC: 37 MG/DL (ref 40–60)
HGB BLD-MCNC: 16.7 G/DL (ref 13–17.7)
IMM GRANULOCYTES # BLD AUTO: 0.03 10*3/MM3 (ref 0–0.05)
IMM GRANULOCYTES NFR BLD AUTO: 0.5 % (ref 0–0.5)
LDLC SERPL CALC-MCNC: 102 MG/DL (ref 0–100)
LDLC/HDLC SERPL: 2.75 {RATIO}
LYMPHOCYTES # BLD AUTO: 1.58 10*3/MM3 (ref 0.7–3.1)
LYMPHOCYTES NFR BLD AUTO: 26.7 % (ref 19.6–45.3)
MCH RBC QN AUTO: 31.5 PG (ref 26.6–33)
MCHC RBC AUTO-ENTMCNC: 32.1 G/DL (ref 31.5–35.7)
MCV RBC AUTO: 97.9 FL (ref 79–97)
MONOCYTES # BLD AUTO: 0.77 10*3/MM3 (ref 0.1–0.9)
MONOCYTES NFR BLD AUTO: 13 % (ref 5–12)
NEUTROPHILS # BLD AUTO: 3.41 10*3/MM3 (ref 1.7–7)
NEUTROPHILS NFR BLD AUTO: 57.6 % (ref 42.7–76)
NRBC BLD AUTO-RTO: 0 /100 WBC (ref 0–0.2)
PLATELET # BLD AUTO: 198 10*3/MM3 (ref 140–450)
PMV BLD AUTO: 11.4 FL (ref 6–12)
POTASSIUM BLD-SCNC: 4.6 MMOL/L (ref 3.5–5.2)
PROT SERPL-MCNC: 7.1 G/DL (ref 6–8.5)
PSA SERPL-MCNC: 3.02 NG/ML (ref 0–4)
RBC # BLD AUTO: 5.31 10*6/MM3 (ref 4.14–5.8)
SODIUM BLD-SCNC: 136 MMOL/L (ref 136–145)
TRIGL SERPL-MCNC: 302 MG/DL (ref 0–150)
TSH SERPL DL<=0.05 MIU/L-ACNC: 4.16 MIU/ML (ref 0.27–4.2)
VIT B12 BLD-MCNC: 574 PG/ML (ref 211–946)
VLDLC SERPL-MCNC: 60.4 MG/DL (ref 5–40)
WBC NRBC COR # BLD: 5.92 10*3/MM3 (ref 3.4–10.8)

## 2019-08-20 PROCEDURE — 80053 COMPREHEN METABOLIC PANEL: CPT | Performed by: NURSE PRACTITIONER

## 2019-08-20 PROCEDURE — 82306 VITAMIN D 25 HYDROXY: CPT | Performed by: NURSE PRACTITIONER

## 2019-08-20 PROCEDURE — 85025 COMPLETE CBC W/AUTO DIFF WBC: CPT | Performed by: NURSE PRACTITIONER

## 2019-08-20 PROCEDURE — 80061 LIPID PANEL: CPT | Performed by: NURSE PRACTITIONER

## 2019-08-20 PROCEDURE — 82746 ASSAY OF FOLIC ACID SERUM: CPT | Performed by: NURSE PRACTITIONER

## 2019-08-20 PROCEDURE — 84443 ASSAY THYROID STIM HORMONE: CPT | Performed by: NURSE PRACTITIONER

## 2019-08-20 PROCEDURE — G0103 PSA SCREENING: HCPCS | Performed by: NURSE PRACTITIONER

## 2019-08-20 PROCEDURE — 82607 VITAMIN B-12: CPT | Performed by: NURSE PRACTITIONER

## 2019-08-20 PROCEDURE — 99396 PREV VISIT EST AGE 40-64: CPT | Performed by: NURSE PRACTITIONER

## 2019-08-20 RX ORDER — ERGOCALCIFEROL 1.25 MG/1
50000 CAPSULE ORAL WEEKLY
Qty: 12 CAPSULE | Refills: 1 | Status: SHIPPED | OUTPATIENT
Start: 2019-08-20 | End: 2023-03-21

## 2019-08-20 NOTE — PROGRESS NOTES
Subjective   Chief Complaint   Patient presents with   • Annual Exam        Glen Sousa is a 58 y.o. male here today for annual exam.  He is doing well overall with no concerns or questions.  He denies any shortness of air or chest pain.    Overall healthy: Yes  Regular exercise:  Yes  Last colon screening:  Yes, up to date and normal  Immunizations up to date:  Yes  PSA: Yes  Sexually active: Yes  ED or bedroom issues: No  Concern for STDs:  No  Vitamin Supplement:  Yes  Regular dental exam:  Yes   Diet is well balanced:  Yes  Alcohol intake:  Yes, social   Tobacco use:  No  Cardiovascular risk is low:  Yes   Wear a seatbelt regularly:  Yes  Wear sunscreen regularly when outdoors:  Yes, sees dermatologist.     Review of Systems   Constitutional: Negative for activity change, appetite change, chills, diaphoresis, fatigue, fever, unexpected weight gain and unexpected weight loss.   HENT: Negative for ear discharge, ear pain, mouth sores, nosebleeds, sinus pressure, sneezing and sore throat.    Eyes: Negative for pain, discharge and itching.   Respiratory: Negative for cough, chest tightness, shortness of breath and wheezing.    Cardiovascular: Negative for chest pain, palpitations and leg swelling.   Gastrointestinal: Negative for abdominal pain, constipation, diarrhea, nausea and vomiting.   Endocrine: Negative for heat intolerance, polydipsia and polyphagia.   Genitourinary: Negative for dysuria, flank pain, frequency, hematuria and urgency.   Musculoskeletal: Negative for arthralgias, back pain, gait problem, joint swelling, myalgias, neck pain and neck stiffness.   Skin: Negative for color change, pallor and rash.   Allergic/Immunologic: Negative for immunocompromised state.   Neurological: Negative for seizures, speech difficulty, weakness and numbness.   Hematological: Negative for adenopathy.   Psychiatric/Behavioral: Negative for agitation, decreased concentration, sleep disturbance and depressed mood. The  "patient is not nervous/anxious.        Past Medical History:   Diagnosis Date   • Acute sinusitis     Getting over sinus infection. Symptoms better with augmentin.   • Elbow enthesopathy    • Hyperlipoproteinemia type IV    • Overweight    • Plantar fasciitis    • Vitamin D deficiency      Past Surgical History:   Procedure Laterality Date   • LASIK       Family History   Problem Relation Age of Onset   • Diabetes Other    • Hypertension Other    • Hyperlipidemia Other    • Cancer Other    • Stroke Other    • Arthritis Other    • Graves' disease Other    • Diverticulosis Other         of intestine   • Breast cancer Other      Social History     Tobacco Use   Smoking Status Never Smoker     Social History     Substance and Sexual Activity   Alcohol Use Yes    Comment:  · 1 beer weekly     Allergies   Allergen Reactions   • Niacin    • Niacin And Related        Current Outpatient Medications on File Prior to Visit   Medication Sig   • atorvastatin (LIPITOR) 20 MG tablet TAKE 1 TABLET BY MOUTH EVERY NIGHT. NEEDS FASTING IN 6-8 WEEKS   • sildenafil (REVATIO) 20 MG tablet 2-4 tabs as needed     No current facility-administered medications on file prior to visit.        Objective   Vitals:    08/20/19 1116   BP: 120/82   BP Location: Left arm   Patient Position: Sitting   Cuff Size: Adult   Pulse: 73   Resp: 20   Temp: 98.5 °F (36.9 °C)   TempSrc: Temporal   SpO2: 95%   Weight: 103 kg (227 lb)   Height: 182.9 cm (72\")   PainSc: 0-No pain     Body mass index is 30.79 kg/m².    Physical Exam   Constitutional: He is oriented to person, place, and time. He appears well-developed and well-nourished.   HENT:   Head: Normocephalic and atraumatic.   Eyes: EOM are normal. Pupils are equal, round, and reactive to light.   Neck: Normal range of motion.   Cardiovascular: Normal rate, regular rhythm and normal heart sounds.   Pulmonary/Chest: Effort normal and breath sounds normal.   Abdominal: Soft. Bowel sounds are normal. "   Musculoskeletal: Normal range of motion.   Neurological: He is alert and oriented to person, place, and time.   Skin: Skin is warm and dry.   Psychiatric: He has a normal mood and affect. His behavior is normal.   Vitals reviewed.        Assessment/Plan     Current Outpatient Medications:   •  atorvastatin (LIPITOR) 20 MG tablet, TAKE 1 TABLET BY MOUTH EVERY NIGHT. NEEDS FASTING IN 6-8 WEEKS, Disp: 90 tablet, Rfl: 3  •  sildenafil (REVATIO) 20 MG tablet, 2-4 tabs as needed, Disp: 90 tablet, Rfl: 5  •  vitamin D (ERGOCALCIFEROL) 93386 units capsule capsule, Take 1 capsule by mouth 1 (One) Time Per Week., Disp: 12 capsule, Rfl: 1    Glen was seen today for annual exam.    Diagnoses and all orders for this visit:    Wellness examination  -     CBC Auto Differential  -     Comprehensive Metabolic Panel  -     Lipid Panel  -     PSA Screen  -     Vitamin B12 & Folate  -     Vitamin D 25 Hydroxy  -     TSH Rfx On Abnormal To Free T4    Vitamin D deficiency  -     vitamin D (ERGOCALCIFEROL) 39000 units capsule capsule; Take 1 capsule by mouth 1 (One) Time Per Week.    Screening for blood disease  -     CBC Auto Differential  -     Comprehensive Metabolic Panel  -     Lipid Panel  -     PSA Screen  -     Vitamin B12 & Folate  -     Vitamin D 25 Hydroxy  -     TSH Rfx On Abnormal To Free T4    Screening for prostate cancer  -     PSA Screen    Encounter for vitamin deficiency screening  -     Vitamin B12 & Folate  -     Vitamin D 25 Hydroxy    Lipid screening  -     Lipid Panel    Thyroid disorder screening  -     TSH Rfx On Abnormal To Free T4              Counseling was given to patient for the following topics: appropriate exercise, healthy eating habits, disease prevention, risk factors for cancer, importance of self testicular exam, bone health and sun safety.      Plan of care reviewed with the patient at the conclusion of today's visit.  Education was provided regarding diagnosis, management, and any prescribed or  recommended OTC medications.  Patient verbalized understanding of and agreement with management plan.     Return in about 1 year (around 8/20/2020), or if symptoms worsen or fail to improve.      Edwige Silva, APRN

## 2019-08-21 NOTE — PROGRESS NOTES
Please contact patient and advise that his triglycerides are elevated more than last set of labs. Have him start a fish oil or omega 3 supplement of 1200-2400mg daily and increase his fiber intake. He will get these repeated at his next follow up with dr. Ibrahim. All other results are normal.

## 2019-11-13 ENCOUNTER — TELEPHONE (OUTPATIENT)
Dept: INTERNAL MEDICINE | Facility: CLINIC | Age: 59
End: 2019-11-13

## 2019-11-13 RX ORDER — PREDNISONE 10 MG/1
TABLET ORAL
Qty: 48 EACH | Refills: 0 | Status: SHIPPED | OUTPATIENT
Start: 2019-11-13 | End: 2020-08-24

## 2019-11-13 NOTE — TELEPHONE ENCOUNTER
PATIENT CALLED AND STATES HE HAS POISON IVY AND HAS USED CALAMINE AND HYDROCORTIZONE LOTION AND SEEMS TO BE SPREADING .     PHARMACY - [facility] [address]  PATIENT CALL BACK NUMBER [phone]

## 2019-12-30 ENCOUNTER — TELEPHONE (OUTPATIENT)
Dept: INTERNAL MEDICINE | Facility: CLINIC | Age: 59
End: 2019-12-30

## 2019-12-30 NOTE — TELEPHONE ENCOUNTER
Patient called in stating that he has a sinus infection, and was wondering if something like a ZPAC could be called in for him, tried to beat it with Muxinex but didn't help

## 2019-12-31 RX ORDER — AZITHROMYCIN 250 MG/1
TABLET, FILM COATED ORAL
Qty: 6 TABLET | Refills: 0 | Status: SHIPPED | OUTPATIENT
Start: 2019-12-31 | End: 2020-08-24

## 2020-02-03 RX ORDER — ATORVASTATIN CALCIUM 20 MG/1
20 TABLET, FILM COATED ORAL NIGHTLY
Qty: 90 TABLET | Refills: 3 | Status: SHIPPED | OUTPATIENT
Start: 2020-02-03 | End: 2021-01-18

## 2020-08-24 ENCOUNTER — OFFICE VISIT (OUTPATIENT)
Dept: INTERNAL MEDICINE | Facility: CLINIC | Age: 60
End: 2020-08-24

## 2020-08-24 VITALS
HEIGHT: 72 IN | WEIGHT: 218 LBS | BODY MASS INDEX: 29.53 KG/M2 | HEART RATE: 68 BPM | DIASTOLIC BLOOD PRESSURE: 68 MMHG | SYSTOLIC BLOOD PRESSURE: 124 MMHG | TEMPERATURE: 97.5 F

## 2020-08-24 DIAGNOSIS — Z00.00 ROUTINE GENERAL MEDICAL EXAMINATION AT A HEALTH CARE FACILITY: ICD-10-CM

## 2020-08-24 DIAGNOSIS — Z12.11 SCREEN FOR COLON CANCER: ICD-10-CM

## 2020-08-24 DIAGNOSIS — E78.2 MIXED HYPERLIPIDEMIA: Primary | ICD-10-CM

## 2020-08-24 DIAGNOSIS — E55.9 VITAMIN D DEFICIENCY: ICD-10-CM

## 2020-08-24 DIAGNOSIS — Z12.5 SCREENING FOR PROSTATE CANCER: ICD-10-CM

## 2020-08-24 LAB
DEVELOPER EXPIRATION DATE: NORMAL
DEVELOPER LOT NUMBER: NORMAL
EXPIRATION DATE: NORMAL
FECAL OCCULT BLOOD SCREEN, POC: NEGATIVE
Lab: NORMAL
NEGATIVE CONTROL: NEGATIVE
POSITIVE CONTROL: POSITIVE

## 2020-08-24 PROCEDURE — 99396 PREV VISIT EST AGE 40-64: CPT | Performed by: INTERNAL MEDICINE

## 2020-08-24 PROCEDURE — 82270 OCCULT BLOOD FECES: CPT | Performed by: INTERNAL MEDICINE

## 2020-08-24 NOTE — PROGRESS NOTES
Patient is a 59 y.o. male who is here for a physical.  Chief Complaint   Patient presents with   • Annual Exam         HPI:    Here for CPE.      History:     Patient Active Problem List   Diagnosis   • Hyperlipidemia   • Male erectile disorder   • FARIDEH on CPAP   • Allergic rhinitis   • Arthritis   • Vitamin D deficiency   • Routine general medical examination at a health care facility       Past Medical History:   Diagnosis Date   • Acute sinusitis     Getting over sinus infection. Symptoms better with augmentin.   • Elbow enthesopathy    • Hyperlipoproteinemia type IV    • Overweight    • Plantar fasciitis    • Vitamin D deficiency        Past Surgical History:   Procedure Laterality Date   • LASIK         Current Outpatient Medications on File Prior to Visit   Medication Sig   • atorvastatin (LIPITOR) 20 MG tablet TAKE 1 TABLET BY MOUTH EVERY NIGHT. NEEDS FASTING IN 6-8 WEEKS   • sildenafil (REVATIO) 20 MG tablet 2-4 tabs as needed   • vitamin D (ERGOCALCIFEROL) 60094 units capsule capsule Take 1 capsule by mouth 1 (One) Time Per Week.   • [DISCONTINUED] azithromycin (ZITHROMAX Z-ABBY) 250 MG tablet Take 2 tablets the first day, then 1 tablet daily for 4 days.   • [DISCONTINUED] predniSONE (DELTASONE) 10 MG (48) tablet pack As directed     No current facility-administered medications on file prior to visit.        Family History   Problem Relation Age of Onset   • Diabetes Other    • Hypertension Other    • Hyperlipidemia Other    • Cancer Other    • Stroke Other    • Arthritis Other    • Graves' disease Other    • Diverticulosis Other         of intestine   • Breast cancer Other        Social History     Socioeconomic History   • Marital status:      Spouse name: Not on file   • Number of children: Not on file   • Years of education: Not on file   • Highest education level: Not on file   Tobacco Use   • Smoking status: Never Smoker   • Smokeless tobacco: Never Used   Substance and Sexual Activity   •  "Alcohol use: Yes     Comment:  · 1 beer weekly         Review of Systems   Constitutional: Negative for chills, fatigue, fever and unexpected weight change.   HENT: Negative for ear pain, hearing loss, sore throat and trouble swallowing.    Eyes: Negative for discharge and itching.   Respiratory: Negative for cough, chest tightness and shortness of breath.    Cardiovascular: Negative for chest pain, palpitations and leg swelling.   Gastrointestinal: Negative for abdominal pain, blood in stool, constipation, diarrhea and vomiting.        12/13 colonoscopy normal   Endocrine: Negative for polydipsia and polyuria.   Genitourinary: Negative for difficulty urinating, dysuria, enuresis, frequency, hematuria and urgency.        8/20 psa   Musculoskeletal: Positive for arthralgias. Negative for gait problem and joint swelling.   Skin: Negative for rash and wound.   Allergic/Immunologic: Negative for immunocompromised state.   Neurological: Negative for dizziness, syncope, weakness, light-headedness, numbness and headaches.   Hematological: Does not bruise/bleed easily.   Psychiatric/Behavioral: Negative for behavioral problems, dysphoric mood and sleep disturbance. The patient is not nervous/anxious.        /68 (BP Location: Left arm, Patient Position: Sitting)   Pulse 68   Temp 97.5 °F (36.4 °C) (Infrared)   Ht 182.9 cm (72\")   Wt 98.9 kg (218 lb)   BMI 29.57 kg/m²       Physical Exam   Constitutional: He is oriented to person, place, and time. He appears well-developed and well-nourished.   HENT:   Head: Normocephalic and atraumatic.   Right Ear: External ear normal.   Left Ear: External ear normal.   Mouth/Throat: Oropharynx is clear and moist.   Eyes: Pupils are equal, round, and reactive to light. Conjunctivae and EOM are normal.   Neck: Normal range of motion. Neck supple.   Cardiovascular: Normal rate, regular rhythm, normal heart sounds and intact distal pulses.   Pulmonary/Chest: Effort normal and breath " sounds normal.   Abdominal: Soft. Bowel sounds are normal.   Genitourinary: Rectum normal and prostate normal.   Musculoskeletal: Normal range of motion.   Neurological: He is alert and oriented to person, place, and time. He has normal reflexes.   Skin: Skin is warm and dry.   Psychiatric: He has a normal mood and affect. His behavior is normal. Judgment and thought content normal.   Vitals reviewed.      Procedure:      Discussion/Summary:    hyperlipidemia-labs soon, counseled on diet  tara-cont cpap, stable  rhinitis-advised compliance with flonase and prn allegra, stable  ED-sildenafil prn  Vit d def-recheck on rtc  HME-fasting labs soon, praised wt loss     Labs noted and dw patient, counseled on diet and exercise    Reviewed the following with the patient: advised patient to avoid alcoholic beverages, encouraged patient to exercise 5-7 days per week for 30 minutes at a time, ideal body weight discussed with patient and weight loss encouraged.      Current Outpatient Medications:   •  atorvastatin (LIPITOR) 20 MG tablet, TAKE 1 TABLET BY MOUTH EVERY NIGHT. NEEDS FASTING IN 6-8 WEEKS, Disp: 90 tablet, Rfl: 3  •  sildenafil (REVATIO) 20 MG tablet, 2-4 tabs as needed, Disp: 90 tablet, Rfl: 5  •  vitamin D (ERGOCALCIFEROL) 33712 units capsule capsule, Take 1 capsule by mouth 1 (One) Time Per Week., Disp: 12 capsule, Rfl: 1        Glen was seen today for annual exam.    Diagnoses and all orders for this visit:    Mixed hyperlipidemia    Vitamin D deficiency    Routine general medical examination at a health care facility  -     POC Occult Blood Stool  -     POC Urinalysis Dipstick, Automated    Screening for prostate cancer    Screen for colon cancer  -     POC Occult Blood Stool

## 2020-08-25 ENCOUNTER — LAB (OUTPATIENT)
Dept: LAB | Facility: HOSPITAL | Age: 60
End: 2020-08-25

## 2020-08-25 DIAGNOSIS — Z00.00 ROUTINE GENERAL MEDICAL EXAMINATION AT A HEALTH CARE FACILITY: Primary | ICD-10-CM

## 2020-08-25 DIAGNOSIS — R73.09 ABNORMAL GLUCOSE: Primary | ICD-10-CM

## 2020-08-25 DIAGNOSIS — E55.9 VITAMIN D DEFICIENCY: ICD-10-CM

## 2020-08-25 DIAGNOSIS — Z12.5 SCREENING FOR MALIGNANT NEOPLASM OF PROSTATE: ICD-10-CM

## 2020-08-25 DIAGNOSIS — E78.2 MIXED HYPERLIPIDEMIA: ICD-10-CM

## 2020-08-25 DIAGNOSIS — Z00.00 ROUTINE GENERAL MEDICAL EXAMINATION AT A HEALTH CARE FACILITY: ICD-10-CM

## 2020-08-25 LAB
25(OH)D3 SERPL-MCNC: 37.7 NG/ML (ref 30–100)
ALBUMIN SERPL-MCNC: 4.5 G/DL (ref 3.5–5.2)
ALBUMIN/GLOB SERPL: 2 G/DL
ALP SERPL-CCNC: 61 U/L (ref 39–117)
ALT SERPL W P-5'-P-CCNC: 23 U/L (ref 1–41)
ANION GAP SERPL CALCULATED.3IONS-SCNC: 10.9 MMOL/L (ref 5–15)
AST SERPL-CCNC: 17 U/L (ref 1–40)
BILIRUB SERPL-MCNC: 0.8 MG/DL (ref 0–1.2)
BUN SERPL-MCNC: 14 MG/DL (ref 6–20)
BUN/CREAT SERPL: 15.7 (ref 7–25)
CALCIUM SPEC-SCNC: 9.3 MG/DL (ref 8.6–10.5)
CHLORIDE SERPL-SCNC: 101 MMOL/L (ref 98–107)
CHOLEST SERPL-MCNC: 174 MG/DL (ref 0–200)
CO2 SERPL-SCNC: 28.1 MMOL/L (ref 22–29)
CREAT SERPL-MCNC: 0.89 MG/DL (ref 0.76–1.27)
DEPRECATED RDW RBC AUTO: 44.2 FL (ref 37–54)
ERYTHROCYTE [DISTWIDTH] IN BLOOD BY AUTOMATED COUNT: 13 % (ref 12.3–15.4)
GFR SERPL CREATININE-BSD FRML MDRD: 88 ML/MIN/1.73
GLOBULIN UR ELPH-MCNC: 2.2 GM/DL
GLUCOSE SERPL-MCNC: 102 MG/DL (ref 65–99)
HBA1C MFR BLD: 5.47 % (ref 4.8–5.6)
HCT VFR BLD AUTO: 47.1 % (ref 37.5–51)
HDLC SERPL-MCNC: 43 MG/DL (ref 40–60)
HGB BLD-MCNC: 16.4 G/DL (ref 13–17.7)
LDLC SERPL CALC-MCNC: 85 MG/DL (ref 0–100)
LDLC/HDLC SERPL: 1.97 {RATIO}
MCH RBC QN AUTO: 32.8 PG (ref 26.6–33)
MCHC RBC AUTO-ENTMCNC: 34.8 G/DL (ref 31.5–35.7)
MCV RBC AUTO: 94.2 FL (ref 79–97)
PLATELET # BLD AUTO: 173 10*3/MM3 (ref 140–450)
PMV BLD AUTO: 11.1 FL (ref 6–12)
POTASSIUM SERPL-SCNC: 4.6 MMOL/L (ref 3.5–5.2)
PROT SERPL-MCNC: 6.7 G/DL (ref 6–8.5)
PSA SERPL-MCNC: 3.42 NG/ML (ref 0–4)
RBC # BLD AUTO: 5 10*6/MM3 (ref 4.14–5.8)
SODIUM SERPL-SCNC: 140 MMOL/L (ref 136–145)
TRIGL SERPL-MCNC: 231 MG/DL (ref 0–150)
TSH SERPL DL<=0.05 MIU/L-ACNC: 4.25 UIU/ML (ref 0.27–4.2)
VIT B12 BLD-MCNC: 560 PG/ML (ref 211–946)
VLDLC SERPL-MCNC: 46.2 MG/DL (ref 5–40)
WBC # BLD AUTO: 5.69 10*3/MM3 (ref 3.4–10.8)

## 2020-08-25 PROCEDURE — 80061 LIPID PANEL: CPT | Performed by: INTERNAL MEDICINE

## 2020-08-25 PROCEDURE — 36415 COLL VENOUS BLD VENIPUNCTURE: CPT

## 2020-08-25 PROCEDURE — 85027 COMPLETE CBC AUTOMATED: CPT | Performed by: INTERNAL MEDICINE

## 2020-08-25 PROCEDURE — 82306 VITAMIN D 25 HYDROXY: CPT | Performed by: INTERNAL MEDICINE

## 2020-08-25 PROCEDURE — 82607 VITAMIN B-12: CPT | Performed by: INTERNAL MEDICINE

## 2020-08-25 PROCEDURE — 84443 ASSAY THYROID STIM HORMONE: CPT | Performed by: INTERNAL MEDICINE

## 2020-08-25 PROCEDURE — 83036 HEMOGLOBIN GLYCOSYLATED A1C: CPT | Performed by: INTERNAL MEDICINE

## 2020-08-25 PROCEDURE — 80053 COMPREHEN METABOLIC PANEL: CPT | Performed by: INTERNAL MEDICINE

## 2020-08-25 PROCEDURE — G0103 PSA SCREENING: HCPCS | Performed by: INTERNAL MEDICINE

## 2020-10-02 ENCOUNTER — TELEPHONE (OUTPATIENT)
Dept: INTERNAL MEDICINE | Facility: CLINIC | Age: 60
End: 2020-10-02

## 2020-10-02 RX ORDER — AZITHROMYCIN 250 MG/1
TABLET, FILM COATED ORAL
Qty: 6 TABLET | Refills: 0 | Status: SHIPPED | OUTPATIENT
Start: 2020-10-02 | End: 2021-03-11

## 2020-10-02 NOTE — TELEPHONE ENCOUNTER
Pt states this is what he always gets twice a year. He said if you want him to test he will but he feels its his usual sinus infection

## 2020-10-02 NOTE — TELEPHONE ENCOUNTER
Caller: Glen Sousa    Relationship: Self    Best call back number: [phone]     What medication are you requesting: ANTIBIOTIC    What are your current symptoms:   PATIENT BELIEVES HE HAS A SINUS INFECTION.  SYMPTOMS INCLUDE SORE THROAT, DRAINAGE, HEAD PRESSURE.  How long have you been experiencing symptoms: 3 DAYS     Have you had these symptoms before:    [x] Yes  [] No    Have you been treated for these symptoms before:   [x] Yes  [] No    If a prescription is needed, what is your preferred pharmacy and phone number: [id number] - [place], [place] - [address]  - [phone]  - [phone]     Additional notes:  PATIENT STATES HE GETS THIS EVERY YEAR.

## 2020-10-05 RX ORDER — AZITHROMYCIN 250 MG/1
TABLET, FILM COATED ORAL
Qty: 6 TABLET | Refills: 0 | Status: SHIPPED | OUTPATIENT
Start: 2020-10-05 | End: 2021-03-11

## 2021-01-15 DIAGNOSIS — N52.9 MALE ERECTILE DISORDER: ICD-10-CM

## 2021-01-15 RX ORDER — SILDENAFIL CITRATE 20 MG/1
TABLET ORAL
Qty: 90 TABLET | Refills: 5 | Status: SHIPPED | OUTPATIENT
Start: 2021-01-15 | End: 2022-04-04

## 2021-01-15 NOTE — TELEPHONE ENCOUNTER
Caller: Glen Sousa    Relationship: Self    Best call back number: [phone]    Medication needed:   Requested Prescriptions     Pending Prescriptions Disp Refills   • sildenafil (REVATIO) 20 MG tablet 90 tablet 5     Si-4 tabs as needed       When do you need the refill by: ASAP    What details did the patient provide when requesting the medication: PATIENT IS COMPLETLEY OUT OF THIS    Does the patient have less than a 3 day supply:  [x] Yes  [x] No    What is the patient's preferred pharmacy: [address] - [phone] [facility] [phone] FX

## 2021-01-18 RX ORDER — ATORVASTATIN CALCIUM 20 MG/1
20 TABLET, FILM COATED ORAL NIGHTLY
Qty: 90 TABLET | Refills: 3 | Status: SHIPPED | OUTPATIENT
Start: 2021-01-18 | End: 2022-02-09

## 2021-02-08 ENCOUNTER — TELEPHONE (OUTPATIENT)
Dept: INTERNAL MEDICINE | Facility: CLINIC | Age: 61
End: 2021-02-08

## 2021-02-08 NOTE — TELEPHONE ENCOUNTER
PATIENT CALLED REGARDING HIS CPAP MACHINE, HE HAS BROKEN A PIECE OFF HIS FACE MASK AND NEEDS TO KNOW HOW TO GET A NEW ONE. HE GOT ORIGINAL THRU [employer] [facility] AND THEY ARE OUT OF BUSINESS NOW.    THOUGHT YOU COULD HELP.    Glen Sousa     [phone]

## 2021-02-10 NOTE — TELEPHONE ENCOUNTER
PATIENT STATES HE WOULD LIKE THIS ORDER FOR THE CPAP SUPPLIED SENT TO [facility] IN [place].    FAX:[phone]    PATIENT PHONE [phone]

## 2021-02-26 NOTE — TELEPHONE ENCOUNTER
Patient states that he has been trying to get through to [facility] for his CPAP supplies - having no luck.    Wanted a recommendation from office on where else to go and assistance getting these supplies.    [phone]

## 2021-03-11 ENCOUNTER — OFFICE VISIT (OUTPATIENT)
Dept: INTERNAL MEDICINE | Facility: CLINIC | Age: 61
End: 2021-03-11

## 2021-03-11 VITALS
BODY MASS INDEX: 32.23 KG/M2 | HEART RATE: 72 BPM | WEIGHT: 238 LBS | TEMPERATURE: 96.9 F | HEIGHT: 72 IN | DIASTOLIC BLOOD PRESSURE: 80 MMHG | SYSTOLIC BLOOD PRESSURE: 130 MMHG

## 2021-03-11 DIAGNOSIS — E55.9 VITAMIN D DEFICIENCY: ICD-10-CM

## 2021-03-11 DIAGNOSIS — J30.1 SEASONAL ALLERGIC RHINITIS DUE TO POLLEN: Primary | ICD-10-CM

## 2021-03-11 DIAGNOSIS — E78.2 MIXED HYPERLIPIDEMIA: ICD-10-CM

## 2021-03-11 PROCEDURE — 99213 OFFICE O/P EST LOW 20 MIN: CPT | Performed by: INTERNAL MEDICINE

## 2021-03-11 NOTE — PROGRESS NOTES
Patient is a 60 y.o. male who is here for a follow up of hyperlipdemia.  Chief Complaint   Patient presents with   • Hyperlipidemia         HPI:    Here for mgmt of FARIDEH and hyperlipidemia and allergic rhinitis.  Working full time.  Compliant with CPAP.  Energy level is good. Working out of house.  Has gained back the 20 pounds.  No dizziness or lightheadedness or HAs.  No abdominal pains.     History:     Patient Active Problem List   Diagnosis   • Hyperlipidemia   • Male erectile disorder   • FARIDEH on CPAP   • Allergic rhinitis   • Arthritis   • Vitamin D deficiency   • Routine general medical examination at a health care facility       Past Medical History:   Diagnosis Date   • Acute sinusitis     Getting over sinus infection. Symptoms better with augmentin.   • Elbow enthesopathy    • Hyperlipoproteinemia type IV    • Overweight    • Plantar fasciitis    • Vitamin D deficiency        Past Surgical History:   Procedure Laterality Date   • LASIK         Current Outpatient Medications on File Prior to Visit   Medication Sig   • atorvastatin (LIPITOR) 20 MG tablet TAKE 1 TABLET BY MOUTH EVERY NIGHT. NEEDS FASTING IN 6-8 WEEKS   • sildenafil (REVATIO) 20 MG tablet 2-4 tabs as needed   • vitamin D (ERGOCALCIFEROL) 98561 units capsule capsule Take 1 capsule by mouth 1 (One) Time Per Week.   • [DISCONTINUED] azithromycin (Zithromax Z-Salty) 250 MG tablet Take 2 tablets the first day, then 1 tablet daily for 4 days.   • [DISCONTINUED] azithromycin (Zithromax Z-Salty) 250 MG tablet Take 2 tablets the first day, then 1 tablet daily for 4 days.     No current facility-administered medications on file prior to visit.       Family History   Problem Relation Age of Onset   • Diabetes Other    • Hypertension Other    • Hyperlipidemia Other    • Cancer Other    • Stroke Other    • Arthritis Other    • Graves' disease Other    • Diverticulosis Other         of intestine   • Breast cancer Other        Social History     Socioeconomic  "History   • Marital status:      Spouse name: Not on file   • Number of children: Not on file   • Years of education: Not on file   • Highest education level: Not on file   Tobacco Use   • Smoking status: Never Smoker   • Smokeless tobacco: Never Used   Substance and Sexual Activity   • Alcohol use: Yes     Comment:  · 1 beer weekly         Review of Systems   Constitutional: Negative for chills, fatigue, fever and unexpected weight change.   HENT: Negative for ear pain, hearing loss, sore throat and trouble swallowing.    Eyes: Negative for discharge and itching.   Respiratory: Negative for cough, chest tightness and shortness of breath.    Cardiovascular: Negative for chest pain, palpitations and leg swelling.   Gastrointestinal: Negative for abdominal pain, blood in stool, constipation, diarrhea and vomiting.        12/13 colonoscopy normal   Endocrine: Negative for polydipsia and polyuria.   Genitourinary: Negative for difficulty urinating, dysuria, enuresis, frequency, hematuria and urgency.        8/20 psa   Musculoskeletal: Positive for arthralgias. Negative for gait problem and joint swelling.   Skin: Negative for rash and wound.   Allergic/Immunologic: Negative for immunocompromised state.   Neurological: Negative for dizziness, syncope, weakness, light-headedness, numbness and headaches.   Hematological: Does not bruise/bleed easily.   Psychiatric/Behavioral: Negative for behavioral problems, dysphoric mood and sleep disturbance. The patient is not nervous/anxious.        /80   Pulse 72   Temp 96.9 °F (36.1 °C) (Infrared)   Ht 182.9 cm (72.01\")   Wt 108 kg (238 lb)   BMI 32.27 kg/m²       Physical Exam  Constitutional:       Appearance: Normal appearance. He is well-developed.   HENT:      Head: Normocephalic and atraumatic.      Right Ear: External ear normal.      Left Ear: External ear normal.      Nose: Nose normal.      Mouth/Throat:      Mouth: Mucous membranes are moist.      " Pharynx: Oropharynx is clear.   Eyes:      Extraocular Movements: Extraocular movements intact.      Conjunctiva/sclera: Conjunctivae normal.      Pupils: Pupils are equal, round, and reactive to light.   Cardiovascular:      Rate and Rhythm: Normal rate and regular rhythm.      Heart sounds: Normal heart sounds.   Pulmonary:      Effort: Pulmonary effort is normal.      Breath sounds: Normal breath sounds.   Abdominal:      General: Bowel sounds are normal.      Palpations: Abdomen is soft.   Musculoskeletal:         General: Normal range of motion.      Cervical back: Normal range of motion and neck supple.   Lymphadenopathy:      Cervical: No cervical adenopathy.   Skin:     General: Skin is warm and dry.   Neurological:      General: No focal deficit present.      Mental Status: He is alert and oriented to person, place, and time.   Psychiatric:         Mood and Affect: Mood normal.         Behavior: Behavior normal.         Thought Content: Thought content normal.         Procedure:      Discussion/Summary:    hyperlipidemia-labs soon, counseled on diet  tara-cont cpap, stable  rhinitis-advised compliance with flonase and prn allegra, stable  ED-sildenafil prn  Vit d def-recheck on rtc    Prior labs noted and dw patient    Current Outpatient Medications:   •  atorvastatin (LIPITOR) 20 MG tablet, TAKE 1 TABLET BY MOUTH EVERY NIGHT. NEEDS FASTING IN 6-8 WEEKS, Disp: 90 tablet, Rfl: 3  •  sildenafil (REVATIO) 20 MG tablet, 2-4 tabs as needed, Disp: 90 tablet, Rfl: 5  •  vitamin D (ERGOCALCIFEROL) 14846 units capsule capsule, Take 1 capsule by mouth 1 (One) Time Per Week., Disp: 12 capsule, Rfl: 1        Diagnoses and all orders for this visit:    1. Seasonal allergic rhinitis due to pollen (Primary)    2. Mixed hyperlipidemia    3. Vitamin D deficiency

## 2021-03-12 ENCOUNTER — LAB (OUTPATIENT)
Dept: LAB | Facility: HOSPITAL | Age: 61
End: 2021-03-12

## 2021-03-12 DIAGNOSIS — E78.2 MIXED HYPERLIPIDEMIA: Primary | ICD-10-CM

## 2021-03-12 LAB
ALBUMIN SERPL-MCNC: 4.5 G/DL (ref 3.5–5.2)
ALBUMIN/GLOB SERPL: 1.7 G/DL
ALP SERPL-CCNC: 64 U/L (ref 39–117)
ALT SERPL W P-5'-P-CCNC: 28 U/L (ref 1–41)
ANION GAP SERPL CALCULATED.3IONS-SCNC: 6.7 MMOL/L (ref 5–15)
AST SERPL-CCNC: 24 U/L (ref 1–40)
BILIRUB SERPL-MCNC: 0.7 MG/DL (ref 0–1.2)
BUN SERPL-MCNC: 17 MG/DL (ref 6–20)
BUN/CREAT SERPL: 20 (ref 7–25)
CALCIUM SPEC-SCNC: 9.2 MG/DL (ref 8.6–10.5)
CHLORIDE SERPL-SCNC: 106 MMOL/L (ref 98–107)
CHOLEST SERPL-MCNC: 184 MG/DL (ref 0–200)
CO2 SERPL-SCNC: 28.3 MMOL/L (ref 22–29)
CREAT SERPL-MCNC: 0.85 MG/DL (ref 0.76–1.27)
DEPRECATED RDW RBC AUTO: 42.7 FL (ref 37–54)
ERYTHROCYTE [DISTWIDTH] IN BLOOD BY AUTOMATED COUNT: 12.5 % (ref 12.3–15.4)
GFR SERPL CREATININE-BSD FRML MDRD: 93 ML/MIN/1.73
GLOBULIN UR ELPH-MCNC: 2.7 GM/DL
GLUCOSE SERPL-MCNC: 102 MG/DL (ref 65–99)
HCT VFR BLD AUTO: 47.7 % (ref 37.5–51)
HDLC SERPL-MCNC: 34 MG/DL (ref 40–60)
HGB BLD-MCNC: 16.5 G/DL (ref 13–17.7)
LDLC SERPL CALC-MCNC: 109 MG/DL (ref 0–100)
LDLC/HDLC SERPL: 3.02 {RATIO}
MCH RBC QN AUTO: 32.4 PG (ref 26.6–33)
MCHC RBC AUTO-ENTMCNC: 34.6 G/DL (ref 31.5–35.7)
MCV RBC AUTO: 93.5 FL (ref 79–97)
PLATELET # BLD AUTO: 195 10*3/MM3 (ref 140–450)
PMV BLD AUTO: 10.8 FL (ref 6–12)
POTASSIUM SERPL-SCNC: 4.3 MMOL/L (ref 3.5–5.2)
PROT SERPL-MCNC: 7.2 G/DL (ref 6–8.5)
RBC # BLD AUTO: 5.1 10*6/MM3 (ref 4.14–5.8)
SODIUM SERPL-SCNC: 141 MMOL/L (ref 136–145)
TRIGL SERPL-MCNC: 236 MG/DL (ref 0–150)
TSH SERPL DL<=0.05 MIU/L-ACNC: 5.19 UIU/ML (ref 0.27–4.2)
VLDLC SERPL-MCNC: 41 MG/DL (ref 5–40)
WBC # BLD AUTO: 6.21 10*3/MM3 (ref 3.4–10.8)

## 2021-03-12 PROCEDURE — 80053 COMPREHEN METABOLIC PANEL: CPT

## 2021-03-12 PROCEDURE — 85027 COMPLETE CBC AUTOMATED: CPT

## 2021-03-12 PROCEDURE — 83036 HEMOGLOBIN GLYCOSYLATED A1C: CPT | Performed by: INTERNAL MEDICINE

## 2021-03-12 PROCEDURE — 80061 LIPID PANEL: CPT

## 2021-03-12 PROCEDURE — 36415 COLL VENOUS BLD VENIPUNCTURE: CPT

## 2021-03-12 PROCEDURE — 84443 ASSAY THYROID STIM HORMONE: CPT

## 2021-03-13 DIAGNOSIS — R73.09 ABNORMAL GLUCOSE: Primary | ICD-10-CM

## 2021-03-13 LAB — HBA1C MFR BLD: 5.63 % (ref 4.8–5.6)

## 2021-03-31 ENCOUNTER — OFFICE VISIT (OUTPATIENT)
Dept: INTERNAL MEDICINE | Facility: CLINIC | Age: 61
End: 2021-03-31

## 2021-03-31 VITALS
HEIGHT: 72 IN | TEMPERATURE: 96.8 F | DIASTOLIC BLOOD PRESSURE: 78 MMHG | BODY MASS INDEX: 31.83 KG/M2 | WEIGHT: 235 LBS | HEART RATE: 72 BPM | SYSTOLIC BLOOD PRESSURE: 120 MMHG

## 2021-03-31 DIAGNOSIS — E78.49 OTHER HYPERLIPIDEMIA: Primary | ICD-10-CM

## 2021-03-31 DIAGNOSIS — M54.50 TENDERNESS OF LUMBAR REGION: ICD-10-CM

## 2021-03-31 DIAGNOSIS — E55.9 VITAMIN D DEFICIENCY: ICD-10-CM

## 2021-03-31 PROCEDURE — 99214 OFFICE O/P EST MOD 30 MIN: CPT | Performed by: INTERNAL MEDICINE

## 2021-03-31 RX ORDER — MELOXICAM 7.5 MG/1
7.5 TABLET ORAL DAILY PRN
Qty: 30 TABLET | Refills: 2 | Status: SHIPPED | OUTPATIENT
Start: 2021-03-31 | End: 2023-03-21

## 2021-03-31 RX ORDER — TIZANIDINE 4 MG/1
4 TABLET ORAL NIGHTLY PRN
Qty: 30 TABLET | Refills: 2 | Status: SHIPPED | OUTPATIENT
Start: 2021-03-31 | End: 2023-03-21

## 2021-03-31 NOTE — PROGRESS NOTES
Patient is a 60 y.o. male who is here for back pain.  Chief Complaint   Patient presents with   • Back Pain         HPI:    Has long hx of low back pain but over the past few weeks has had worsening symptoms.  Seeing chiropractor.  Using advil prn.  No radiation to LE/buttocks.  Worst with getting in and out of car and bending.  No difficulty sleeping.  Little improvement with manipulation.    Compliant with statin.  No fever or chills.     History:     Patient Active Problem List   Diagnosis   • Hyperlipidemia   • Male erectile disorder   • FARIDEH on CPAP   • Allergic rhinitis   • Arthritis   • Vitamin D deficiency   • Routine general medical examination at a health care facility   • Tenderness of lumbar region       Past Medical History:   Diagnosis Date   • Acute sinusitis     Getting over sinus infection. Symptoms better with augmentin.   • Elbow enthesopathy    • Hyperlipoproteinemia type IV    • Overweight    • Plantar fasciitis    • Vitamin D deficiency        Past Surgical History:   Procedure Laterality Date   • LASIK         Current Outpatient Medications on File Prior to Visit   Medication Sig   • atorvastatin (LIPITOR) 20 MG tablet TAKE 1 TABLET BY MOUTH EVERY NIGHT. NEEDS FASTING IN 6-8 WEEKS   • sildenafil (REVATIO) 20 MG tablet 2-4 tabs as needed   • vitamin D (ERGOCALCIFEROL) 86494 units capsule capsule Take 1 capsule by mouth 1 (One) Time Per Week.     No current facility-administered medications on file prior to visit.       Family History   Problem Relation Age of Onset   • Diabetes Other    • Hypertension Other    • Hyperlipidemia Other    • Cancer Other    • Stroke Other    • Arthritis Other    • Graves' disease Other    • Diverticulosis Other         of intestine   • Breast cancer Other        Social History     Socioeconomic History   • Marital status:      Spouse name: Not on file   • Number of children: Not on file   • Years of education: Not on file   • Highest education level: Not on  "file   Tobacco Use   • Smoking status: Never Smoker   • Smokeless tobacco: Never Used   Substance and Sexual Activity   • Alcohol use: Yes     Comment:  · 1 beer weekly         Review of Systems   Constitutional: Negative for chills, fatigue, fever and unexpected weight change.   HENT: Negative for ear pain, hearing loss, sore throat and trouble swallowing.    Eyes: Negative for discharge and itching.   Respiratory: Negative for cough, chest tightness and shortness of breath.    Cardiovascular: Negative for chest pain, palpitations and leg swelling.   Gastrointestinal: Negative for abdominal pain, blood in stool, constipation, diarrhea and vomiting.        12/13 colonoscopy normal   Endocrine: Negative for polydipsia and polyuria.   Genitourinary: Negative for difficulty urinating, dysuria, enuresis, frequency, hematuria and urgency.        8/20 psa   Musculoskeletal: Positive for arthralgias and back pain. Negative for gait problem and joint swelling.   Skin: Negative for rash and wound.   Allergic/Immunologic: Negative for immunocompromised state.   Neurological: Negative for dizziness, syncope, weakness, light-headedness, numbness and headaches.   Hematological: Does not bruise/bleed easily.   Psychiatric/Behavioral: Negative for behavioral problems, dysphoric mood and sleep disturbance. The patient is not nervous/anxious.        /78   Pulse 72   Temp 96.8 °F (36 °C) (Infrared)   Ht 182.9 cm (72.01\")   Wt 107 kg (235 lb)   BMI 31.86 kg/m²       Physical Exam  Constitutional:       Appearance: Normal appearance. He is well-developed.   HENT:      Head: Normocephalic and atraumatic.      Right Ear: External ear normal.      Left Ear: External ear normal.      Nose: Nose normal.      Mouth/Throat:      Mouth: Mucous membranes are moist.      Pharynx: Oropharynx is clear.   Eyes:      Extraocular Movements: Extraocular movements intact.      Conjunctiva/sclera: Conjunctivae normal.      Pupils: Pupils are " equal, round, and reactive to light.   Cardiovascular:      Rate and Rhythm: Normal rate and regular rhythm.      Heart sounds: Normal heart sounds.   Pulmonary:      Effort: Pulmonary effort is normal.      Breath sounds: Normal breath sounds.   Abdominal:      General: Bowel sounds are normal.      Palpations: Abdomen is soft.   Musculoskeletal:         General: Tenderness present. Swelling: paralumbar tenderness.      Cervical back: Normal range of motion and neck supple.   Lymphadenopathy:      Cervical: No cervical adenopathy.   Skin:     General: Skin is warm and dry.   Neurological:      General: No focal deficit present.      Mental Status: He is alert and oriented to person, place, and time.   Psychiatric:         Mood and Affect: Mood normal.         Behavior: Behavior normal.         Thought Content: Thought content normal.         Procedure:      Discussion/Summary:    hyperlipidemia-labs dw patient, counseled on diet  tara-cont cpap, stable  rhinitis-advised compliance with flonase and prn allegra, stable  ED-sildenafil prn  Vit d def-recheck on rtc, cont replacement  paralumbar strain-rx mobic and zanaflex, rx for PT     3/13 labs noted and dw patient    Current Outpatient Medications:   •  atorvastatin (LIPITOR) 20 MG tablet, TAKE 1 TABLET BY MOUTH EVERY NIGHT. NEEDS FASTING IN 6-8 WEEKS, Disp: 90 tablet, Rfl: 3  •  sildenafil (REVATIO) 20 MG tablet, 2-4 tabs as needed, Disp: 90 tablet, Rfl: 5  •  vitamin D (ERGOCALCIFEROL) 88254 units capsule capsule, Take 1 capsule by mouth 1 (One) Time Per Week., Disp: 12 capsule, Rfl: 1  •  meloxicam (MOBIC) 7.5 MG tablet, Take 1 tablet by mouth Daily As Needed for Moderate Pain ., Disp: 30 tablet, Rfl: 2  •  tiZANidine (ZANAFLEX) 4 MG tablet, Take 1 tablet by mouth At Night As Needed for Muscle Spasms., Disp: 30 tablet, Rfl: 2        Diagnoses and all orders for this visit:    1. Other hyperlipidemia (Primary)    2. Vitamin D deficiency    3. Tenderness of lumbar  region  -     meloxicam (MOBIC) 7.5 MG tablet; Take 1 tablet by mouth Daily As Needed for Moderate Pain .  Dispense: 30 tablet; Refill: 2  -     tiZANidine (ZANAFLEX) 4 MG tablet; Take 1 tablet by mouth At Night As Needed for Muscle Spasms.  Dispense: 30 tablet; Refill: 2

## 2021-09-30 ENCOUNTER — OFFICE VISIT (OUTPATIENT)
Dept: INTERNAL MEDICINE | Facility: CLINIC | Age: 61
End: 2021-09-30

## 2021-09-30 ENCOUNTER — LAB (OUTPATIENT)
Dept: LAB | Facility: HOSPITAL | Age: 61
End: 2021-09-30

## 2021-09-30 VITALS
OXYGEN SATURATION: 97 % | DIASTOLIC BLOOD PRESSURE: 76 MMHG | SYSTOLIC BLOOD PRESSURE: 120 MMHG | BODY MASS INDEX: 31.83 KG/M2 | HEART RATE: 68 BPM | HEIGHT: 72 IN | TEMPERATURE: 96.9 F | WEIGHT: 235 LBS

## 2021-09-30 DIAGNOSIS — Z12.11 SCREEN FOR COLON CANCER: ICD-10-CM

## 2021-09-30 DIAGNOSIS — G47.33 OSA ON CPAP: ICD-10-CM

## 2021-09-30 DIAGNOSIS — J30.1 SEASONAL ALLERGIC RHINITIS DUE TO POLLEN: Primary | ICD-10-CM

## 2021-09-30 DIAGNOSIS — Z99.89 OSA ON CPAP: ICD-10-CM

## 2021-09-30 DIAGNOSIS — R73.09 ABNORMAL GLUCOSE: ICD-10-CM

## 2021-09-30 DIAGNOSIS — E78.49 OTHER HYPERLIPIDEMIA: ICD-10-CM

## 2021-09-30 DIAGNOSIS — Z00.00 ROUTINE GENERAL MEDICAL EXAMINATION AT A HEALTH CARE FACILITY: ICD-10-CM

## 2021-09-30 DIAGNOSIS — Z12.5 SCREENING FOR PROSTATE CANCER: ICD-10-CM

## 2021-09-30 DIAGNOSIS — E55.9 VITAMIN D DEFICIENCY: ICD-10-CM

## 2021-09-30 LAB
ALBUMIN SERPL-MCNC: 4.4 G/DL (ref 3.5–5.2)
ALBUMIN/GLOB SERPL: 1.8 G/DL
ALP SERPL-CCNC: 73 U/L (ref 39–117)
ALT SERPL W P-5'-P-CCNC: 31 U/L (ref 1–41)
ANION GAP SERPL CALCULATED.3IONS-SCNC: 10.2 MMOL/L (ref 5–15)
AST SERPL-CCNC: 27 U/L (ref 1–40)
BILIRUB BLD-MCNC: NEGATIVE MG/DL
BILIRUB SERPL-MCNC: 0.4 MG/DL (ref 0–1.2)
BUN SERPL-MCNC: 15 MG/DL (ref 6–20)
BUN/CREAT SERPL: 17.4 (ref 7–25)
CALCIUM SPEC-SCNC: 9.1 MG/DL (ref 8.6–10.5)
CHLORIDE SERPL-SCNC: 106 MMOL/L (ref 98–107)
CHOLEST SERPL-MCNC: 163 MG/DL (ref 0–200)
CLARITY, POC: CLEAR
CO2 SERPL-SCNC: 23.8 MMOL/L (ref 22–29)
COLOR UR: YELLOW
CREAT SERPL-MCNC: 0.86 MG/DL (ref 0.76–1.27)
DEPRECATED RDW RBC AUTO: 43.2 FL (ref 37–54)
DEVELOPER EXPIRATION DATE: NORMAL
DEVELOPER LOT NUMBER: NORMAL
ERYTHROCYTE [DISTWIDTH] IN BLOOD BY AUTOMATED COUNT: 12.6 % (ref 12.3–15.4)
EXPIRATION DATE: NORMAL
FECAL OCCULT BLOOD SCREEN, POC: NEGATIVE
GFR SERPL CREATININE-BSD FRML MDRD: 91 ML/MIN/1.73
GLOBULIN UR ELPH-MCNC: 2.4 GM/DL
GLUCOSE SERPL-MCNC: 102 MG/DL (ref 65–99)
GLUCOSE UR STRIP-MCNC: NEGATIVE MG/DL
HCT VFR BLD AUTO: 46.1 % (ref 37.5–51)
HDLC SERPL-MCNC: 32 MG/DL (ref 40–60)
HGB BLD-MCNC: 16.1 G/DL (ref 13–17.7)
KETONES UR QL: NEGATIVE
LDLC SERPL CALC-MCNC: 99 MG/DL (ref 0–100)
LDLC/HDLC SERPL: 2.94 {RATIO}
LEUKOCYTE EST, POC: NEGATIVE
Lab: NORMAL
MCH RBC QN AUTO: 32.8 PG (ref 26.6–33)
MCHC RBC AUTO-ENTMCNC: 34.9 G/DL (ref 31.5–35.7)
MCV RBC AUTO: 93.9 FL (ref 79–97)
NEGATIVE CONTROL: NEGATIVE
NITRITE UR-MCNC: NEGATIVE MG/ML
PH UR: 5 [PH] (ref 5–8)
PLATELET # BLD AUTO: 177 10*3/MM3 (ref 140–450)
PMV BLD AUTO: 11.1 FL (ref 6–12)
POSITIVE CONTROL: POSITIVE
POTASSIUM SERPL-SCNC: 4.5 MMOL/L (ref 3.5–5.2)
PROT SERPL-MCNC: 6.8 G/DL (ref 6–8.5)
PROT UR STRIP-MCNC: NEGATIVE MG/DL
PSA SERPL-MCNC: 3.32 NG/ML (ref 0–4)
RBC # BLD AUTO: 4.91 10*6/MM3 (ref 4.14–5.8)
RBC # UR STRIP: NEGATIVE /UL
SODIUM SERPL-SCNC: 140 MMOL/L (ref 136–145)
SP GR UR: 1.03 (ref 1–1.03)
TRIGL SERPL-MCNC: 184 MG/DL (ref 0–150)
TSH SERPL DL<=0.05 MIU/L-ACNC: 5.15 UIU/ML (ref 0.27–4.2)
UROBILINOGEN UR QL: NORMAL
VLDLC SERPL-MCNC: 32 MG/DL (ref 5–40)
WBC # BLD AUTO: 5.7 10*3/MM3 (ref 3.4–10.8)

## 2021-09-30 PROCEDURE — 80053 COMPREHEN METABOLIC PANEL: CPT | Performed by: INTERNAL MEDICINE

## 2021-09-30 PROCEDURE — 81003 URINALYSIS AUTO W/O SCOPE: CPT | Performed by: INTERNAL MEDICINE

## 2021-09-30 PROCEDURE — G0103 PSA SCREENING: HCPCS | Performed by: INTERNAL MEDICINE

## 2021-09-30 PROCEDURE — 82306 VITAMIN D 25 HYDROXY: CPT | Performed by: INTERNAL MEDICINE

## 2021-09-30 PROCEDURE — 80061 LIPID PANEL: CPT | Performed by: INTERNAL MEDICINE

## 2021-09-30 PROCEDURE — 84443 ASSAY THYROID STIM HORMONE: CPT | Performed by: INTERNAL MEDICINE

## 2021-09-30 PROCEDURE — 83036 HEMOGLOBIN GLYCOSYLATED A1C: CPT | Performed by: INTERNAL MEDICINE

## 2021-09-30 PROCEDURE — 82270 OCCULT BLOOD FECES: CPT | Performed by: INTERNAL MEDICINE

## 2021-09-30 PROCEDURE — 85027 COMPLETE CBC AUTOMATED: CPT | Performed by: INTERNAL MEDICINE

## 2021-09-30 PROCEDURE — 99396 PREV VISIT EST AGE 40-64: CPT | Performed by: INTERNAL MEDICINE

## 2021-10-01 LAB
25(OH)D3 SERPL-MCNC: 34.3 NG/ML
HBA1C MFR BLD: 5.65 % (ref 4.8–5.6)

## 2021-12-21 ENCOUNTER — TELEPHONE (OUTPATIENT)
Dept: INTERNAL MEDICINE | Facility: CLINIC | Age: 61
End: 2021-12-21

## 2021-12-21 NOTE — TELEPHONE ENCOUNTER
Caller: Bibiana Sousa    Relationship: Emergency Contact    Best call back number: [phone]    What orders are you requesting (i.e. 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

## 2022-02-09 RX ORDER — ATORVASTATIN CALCIUM 20 MG/1
TABLET, FILM COATED ORAL
Qty: 150 TABLET | Refills: 0 | Status: SHIPPED | OUTPATIENT
Start: 2022-02-09 | End: 2022-07-20

## 2022-04-04 ENCOUNTER — OFFICE VISIT (OUTPATIENT)
Dept: INTERNAL MEDICINE | Facility: CLINIC | Age: 62
End: 2022-04-04

## 2022-04-04 ENCOUNTER — LAB (OUTPATIENT)
Dept: LAB | Facility: HOSPITAL | Age: 62
End: 2022-04-04

## 2022-04-04 VITALS
DIASTOLIC BLOOD PRESSURE: 76 MMHG | HEIGHT: 72 IN | HEART RATE: 67 BPM | SYSTOLIC BLOOD PRESSURE: 120 MMHG | OXYGEN SATURATION: 98 % | WEIGHT: 239 LBS | TEMPERATURE: 96.9 F | BODY MASS INDEX: 32.37 KG/M2

## 2022-04-04 DIAGNOSIS — M19.90 ARTHRITIS: ICD-10-CM

## 2022-04-04 DIAGNOSIS — E55.9 VITAMIN D DEFICIENCY: ICD-10-CM

## 2022-04-04 DIAGNOSIS — G47.33 OSA ON CPAP: ICD-10-CM

## 2022-04-04 DIAGNOSIS — E78.49 OTHER HYPERLIPIDEMIA: Primary | ICD-10-CM

## 2022-04-04 DIAGNOSIS — Z99.89 OSA ON CPAP: ICD-10-CM

## 2022-04-04 LAB
ALBUMIN SERPL-MCNC: 4.4 G/DL (ref 3.5–5.2)
ALBUMIN/GLOB SERPL: 2.1 G/DL
ALP SERPL-CCNC: 69 U/L (ref 39–117)
ALT SERPL W P-5'-P-CCNC: 27 U/L (ref 1–41)
ANION GAP SERPL CALCULATED.3IONS-SCNC: 10 MMOL/L (ref 5–15)
AST SERPL-CCNC: 25 U/L (ref 1–40)
BILIRUB SERPL-MCNC: 0.6 MG/DL (ref 0–1.2)
BUN SERPL-MCNC: 17 MG/DL (ref 6–20)
BUN/CREAT SERPL: 19.5 (ref 7–25)
CALCIUM SPEC-SCNC: 8.8 MG/DL (ref 8.6–10.5)
CHLORIDE SERPL-SCNC: 107 MMOL/L (ref 98–107)
CHOLEST SERPL-MCNC: 158 MG/DL (ref 0–200)
CO2 SERPL-SCNC: 25 MMOL/L (ref 22–29)
CREAT SERPL-MCNC: 0.87 MG/DL (ref 0.76–1.27)
EGFRCR SERPLBLD CKD-EPI 2021: 99.4 ML/MIN/1.73
GLOBULIN UR ELPH-MCNC: 2.1 GM/DL
GLUCOSE SERPL-MCNC: 113 MG/DL (ref 65–99)
HDLC SERPL-MCNC: 34 MG/DL (ref 40–60)
LDLC SERPL CALC-MCNC: 92 MG/DL (ref 0–100)
LDLC/HDLC SERPL: 2.57 {RATIO}
POTASSIUM SERPL-SCNC: 3.9 MMOL/L (ref 3.5–5.2)
PROT SERPL-MCNC: 6.5 G/DL (ref 6–8.5)
SODIUM SERPL-SCNC: 142 MMOL/L (ref 136–145)
TRIGL SERPL-MCNC: 183 MG/DL (ref 0–150)
VLDLC SERPL-MCNC: 32 MG/DL (ref 5–40)

## 2022-04-04 PROCEDURE — 80061 LIPID PANEL: CPT | Performed by: INTERNAL MEDICINE

## 2022-04-04 PROCEDURE — 80053 COMPREHEN METABOLIC PANEL: CPT | Performed by: INTERNAL MEDICINE

## 2022-04-04 PROCEDURE — 99214 OFFICE O/P EST MOD 30 MIN: CPT | Performed by: INTERNAL MEDICINE

## 2022-04-04 RX ORDER — SILDENAFIL 100 MG/1
100 TABLET, FILM COATED ORAL DAILY PRN
Qty: 30 TABLET | Refills: 5 | Status: SHIPPED | OUTPATIENT
Start: 2022-04-04

## 2022-04-04 NOTE — PROGRESS NOTES
Patient is a 61 y.o. male who is here for a follow up of hyperlipidemia.  Chief Complaint   Patient presents with   • Hyperlipidemia         HPI:    Here for mgmt of hyperlipidemia and rhinitis.  Sleeping well with CPAP.  Trying to watch his diet.  Has gained about 4 pounds.  No dizziness or lightheadedness.  No HAs.  No abdominal pains.    History:     Patient Active Problem List   Diagnosis   • Hyperlipidemia   • Male erectile disorder   • FARIDEH on CPAP   • Allergic rhinitis   • Arthritis   • Vitamin D deficiency   • Routine general medical examination at a health care facility   • Tenderness of lumbar region       Past Medical History:   Diagnosis Date   • Acute sinusitis     Getting over sinus infection. Symptoms better with augmentin.   • Elbow enthesopathy    • Hyperlipoproteinemia type IV    • Overweight    • Plantar fasciitis    • Vitamin D deficiency        Past Surgical History:   Procedure Laterality Date   • LASIK         Current Outpatient Medications on File Prior to Visit   Medication Sig   • atorvastatin (LIPITOR) 20 MG tablet TAKE ONE (1) TABLET BY MOUTH NEVERY NIGHT   • meloxicam (MOBIC) 7.5 MG tablet Take 1 tablet by mouth Daily As Needed for Moderate Pain .   • tiZANidine (ZANAFLEX) 4 MG tablet Take 1 tablet by mouth At Night As Needed for Muscle Spasms.   • vitamin D (ERGOCALCIFEROL) 56686 units capsule capsule Take 1 capsule by mouth 1 (One) Time Per Week.   • [DISCONTINUED] sildenafil (REVATIO) 20 MG tablet 2-4 tabs as needed     No current facility-administered medications on file prior to visit.       Family History   Problem Relation Age of Onset   • Diabetes Other    • Hypertension Other    • Hyperlipidemia Other    • Cancer Other    • Stroke Other    • Arthritis Other    • Graves' disease Other    • Diverticulosis Other         of intestine   • Breast cancer Other        Social History     Socioeconomic History   • Marital status:    Tobacco Use   • Smoking status: Never Smoker   •  "Smokeless tobacco: Never Used   Substance and Sexual Activity   • Alcohol use: Yes     Comment:  · 1 beer weekly         Review of Systems   Constitutional: Negative for chills, fatigue, fever and unexpected weight change.   HENT: Negative for ear pain, hearing loss, sore throat and trouble swallowing.    Eyes: Negative for discharge and itching.   Respiratory: Negative for cough, chest tightness and shortness of breath.    Cardiovascular: Negative for chest pain, palpitations and leg swelling.   Gastrointestinal: Negative for abdominal pain, blood in stool, constipation, diarrhea and vomiting.        12/13 colonoscopy normal   Endocrine: Negative for polydipsia and polyuria.   Genitourinary: Negative for difficulty urinating, dysuria, enuresis, frequency, hematuria and urgency.        9/21 psa   Musculoskeletal: Positive for arthralgias and back pain. Negative for gait problem and joint swelling.   Skin: Negative for rash and wound.   Allergic/Immunologic: Negative for immunocompromised state.   Neurological: Negative for dizziness, syncope, weakness, light-headedness, numbness and headaches.   Hematological: Does not bruise/bleed easily.   Psychiatric/Behavioral: Negative for behavioral problems, dysphoric mood and sleep disturbance. The patient is not nervous/anxious.        /76   Pulse 67   Temp 96.9 °F (36.1 °C) (Infrared)   Ht 182.9 cm (72.01\")   Wt 108 kg (239 lb)   SpO2 98%   BMI 32.41 kg/m²       Physical Exam  Constitutional:       Appearance: Normal appearance. He is well-developed.   HENT:      Head: Normocephalic and atraumatic.      Right Ear: External ear normal.      Left Ear: External ear normal.      Nose: Nose normal.      Mouth/Throat:      Mouth: Mucous membranes are moist.      Pharynx: Oropharynx is clear.   Eyes:      Extraocular Movements: Extraocular movements intact.      Conjunctiva/sclera: Conjunctivae normal.      Pupils: Pupils are equal, round, and reactive to light. "   Cardiovascular:      Rate and Rhythm: Normal rate and regular rhythm.      Pulses: Normal pulses.      Heart sounds: Normal heart sounds.   Pulmonary:      Effort: Pulmonary effort is normal.      Breath sounds: Normal breath sounds.   Abdominal:      General: Bowel sounds are normal.      Palpations: Abdomen is soft.   Musculoskeletal:         General: Normal range of motion.      Cervical back: Normal range of motion and neck supple.   Lymphadenopathy:      Cervical: No cervical adenopathy.   Skin:     General: Skin is warm and dry.   Neurological:      General: No focal deficit present.      Mental Status: He is alert and oriented to person, place, and time.   Psychiatric:         Mood and Affect: Mood normal.         Behavior: Behavior normal.         Thought Content: Thought content normal.         Procedure:      Discussion/Summary:    hyperlipidemia-labs today better, counseled on diet  tara-cont cpap, stable  rhinitis-advised compliance with flonase and prn allegra, stable  ED-sildenafil prn  Vit d def-recheck on target, cont replacement  DJD-cont mobic prn     4/4 labs noted and dw patient, counseled on low carb/ncs    Current Outpatient Medications:   •  atorvastatin (LIPITOR) 20 MG tablet, TAKE ONE (1) TABLET BY MOUTH NEVERY NIGHT, Disp: 150 tablet, Rfl: 0  •  meloxicam (MOBIC) 7.5 MG tablet, Take 1 tablet by mouth Daily As Needed for Moderate Pain ., Disp: 30 tablet, Rfl: 2  •  tiZANidine (ZANAFLEX) 4 MG tablet, Take 1 tablet by mouth At Night As Needed for Muscle Spasms., Disp: 30 tablet, Rfl: 2  •  vitamin D (ERGOCALCIFEROL) 91290 units capsule capsule, Take 1 capsule by mouth 1 (One) Time Per Week., Disp: 12 capsule, Rfl: 1        Diagnoses and all orders for this visit:    1. Other hyperlipidemia (Primary)  -     Comprehensive Metabolic Panel  -     Lipid Panel    2. Vitamin D deficiency    3. TARA on CPAP    4. Arthritis

## 2022-07-07 ENCOUNTER — TELEPHONE (OUTPATIENT)
Dept: INTERNAL MEDICINE | Facility: CLINIC | Age: 62
End: 2022-07-07

## 2022-07-07 NOTE — TELEPHONE ENCOUNTER
Caller: Glen Sousa    Relationship to patient: Self    Best call back number:[phone]        Date of positive COVID19 test: NO POSITIVE TEST         COVID19 symptoms SORE THROAT FEVER RUNNY NOSE BODY ACHES AND CHILLS         Additional information or concerns: THE PATIENT STATES THAT HE HAS HAD TWO NEGATIVE COVID-19 TEST AND HAS NOT TESTED POSITIVE THE PATIENT STATES THAT HIS WIFE DID TEST POSITIVE. THE PATIENT STATES THAT HE HAS ALL THE SYMPTOMS SUCH AS SORE THROAT FEVER RUNNY NOSE BODY ACHES AND CHILLS THE PATIENT WOULD LIKE TO KNOW IF THERE IS A MEDICATION THAT HE CAN TAKE      What is the patients preferred pharmacy: [facility] IN [place]

## 2022-07-08 ENCOUNTER — TELEPHONE (OUTPATIENT)
Dept: INTERNAL MEDICINE | Facility: CLINIC | Age: 62
End: 2022-07-08

## 2022-07-08 NOTE — TELEPHONE ENCOUNTER
REDDY RÍOS    [phone]    PATIENT MADE CONTACT TODAY TO CHECK THE STATUS OF HIS REQUEST FROM YESTERDAY, 7/8/22, FOR A COVID ANTIVIRAL MEDICATION TO HELP WITH HIS COVID SYMPTOMS    SORE THROAT  DRY COUGH  LINGERING FEVER  STUFFY NOSE  HEADACHE  BODY ACHES    PATIENT ALSO REQUESTED A PRESCRIPTION FOR AN INHALER AS WELL    PREFERRED PHARMACY:    [facility] - [place], [place]    TELEPHONE CONTACT:    [phone]    DR LEIJA OR GERA

## 2022-07-20 ENCOUNTER — OFFICE VISIT (OUTPATIENT)
Dept: INTERNAL MEDICINE | Facility: CLINIC | Age: 62
End: 2022-07-20

## 2022-07-20 ENCOUNTER — LAB (OUTPATIENT)
Dept: LAB | Facility: HOSPITAL | Age: 62
End: 2022-07-20

## 2022-07-20 VITALS
BODY MASS INDEX: 31.99 KG/M2 | OXYGEN SATURATION: 97 % | WEIGHT: 236.2 LBS | TEMPERATURE: 96.9 F | SYSTOLIC BLOOD PRESSURE: 122 MMHG | DIASTOLIC BLOOD PRESSURE: 78 MMHG | HEIGHT: 72 IN | HEART RATE: 72 BPM

## 2022-07-20 DIAGNOSIS — J06.9 UPPER RESPIRATORY TRACT INFECTION, UNSPECIFIED TYPE: Primary | ICD-10-CM

## 2022-07-20 DIAGNOSIS — U09.9 POST-COVID SYNDROME: ICD-10-CM

## 2022-07-20 DIAGNOSIS — H65.193 OTHER NON-RECURRENT ACUTE NONSUPPURATIVE OTITIS MEDIA OF BOTH EARS: ICD-10-CM

## 2022-07-20 LAB
ALBUMIN SERPL-MCNC: 4.2 G/DL (ref 3.5–5.2)
ALBUMIN/GLOB SERPL: 1.8 G/DL
ALP SERPL-CCNC: 72 U/L (ref 39–117)
ALT SERPL W P-5'-P-CCNC: 35 U/L (ref 1–41)
ANION GAP SERPL CALCULATED.3IONS-SCNC: 11.3 MMOL/L (ref 5–15)
AST SERPL-CCNC: 24 U/L (ref 1–40)
BASOPHILS # BLD AUTO: 0.03 10*3/MM3 (ref 0–0.2)
BASOPHILS NFR BLD AUTO: 0.4 % (ref 0–1.5)
BILIRUB SERPL-MCNC: 0.7 MG/DL (ref 0–1.2)
BUN SERPL-MCNC: 16 MG/DL (ref 6–20)
BUN/CREAT SERPL: 18.8 (ref 7–25)
CALCIUM SPEC-SCNC: 8.8 MG/DL (ref 8.6–10.5)
CHLORIDE SERPL-SCNC: 104 MMOL/L (ref 98–107)
CO2 SERPL-SCNC: 25.7 MMOL/L (ref 22–29)
CREAT SERPL-MCNC: 0.85 MG/DL (ref 0.76–1.27)
CRP SERPL-MCNC: <0.3 MG/DL (ref 0–0.5)
DEPRECATED RDW RBC AUTO: 44.8 FL (ref 37–54)
EGFRCR SERPLBLD CKD-EPI 2021: 100.1 ML/MIN/1.73
EOSINOPHIL # BLD AUTO: 0.19 10*3/MM3 (ref 0–0.4)
EOSINOPHIL NFR BLD AUTO: 2.4 % (ref 0.3–6.2)
ERYTHROCYTE [DISTWIDTH] IN BLOOD BY AUTOMATED COUNT: 12.8 % (ref 12.3–15.4)
ERYTHROCYTE [SEDIMENTATION RATE] IN BLOOD: 8 MM/HR (ref 0–20)
GLOBULIN UR ELPH-MCNC: 2.4 GM/DL
GLUCOSE SERPL-MCNC: 100 MG/DL (ref 65–99)
HCT VFR BLD AUTO: 46.3 % (ref 37.5–51)
HGB BLD-MCNC: 15.8 G/DL (ref 13–17.7)
IMM GRANULOCYTES # BLD AUTO: 0.06 10*3/MM3 (ref 0–0.05)
IMM GRANULOCYTES NFR BLD AUTO: 0.8 % (ref 0–0.5)
LYMPHOCYTES # BLD AUTO: 1.57 10*3/MM3 (ref 0.7–3.1)
LYMPHOCYTES NFR BLD AUTO: 19.9 % (ref 19.6–45.3)
MCH RBC QN AUTO: 32.5 PG (ref 26.6–33)
MCHC RBC AUTO-ENTMCNC: 34.1 G/DL (ref 31.5–35.7)
MCV RBC AUTO: 95.3 FL (ref 79–97)
MONOCYTES # BLD AUTO: 0.78 10*3/MM3 (ref 0.1–0.9)
MONOCYTES NFR BLD AUTO: 9.9 % (ref 5–12)
NEUTROPHILS NFR BLD AUTO: 5.27 10*3/MM3 (ref 1.7–7)
NEUTROPHILS NFR BLD AUTO: 66.6 % (ref 42.7–76)
NRBC BLD AUTO-RTO: 0 /100 WBC (ref 0–0.2)
PLATELET # BLD AUTO: 212 10*3/MM3 (ref 140–450)
PMV BLD AUTO: 10.5 FL (ref 6–12)
POTASSIUM SERPL-SCNC: 4.6 MMOL/L (ref 3.5–5.2)
PROT SERPL-MCNC: 6.6 G/DL (ref 6–8.5)
RBC # BLD AUTO: 4.86 10*6/MM3 (ref 4.14–5.8)
SODIUM SERPL-SCNC: 141 MMOL/L (ref 136–145)
WBC NRBC COR # BLD: 7.9 10*3/MM3 (ref 3.4–10.8)

## 2022-07-20 PROCEDURE — 85652 RBC SED RATE AUTOMATED: CPT | Performed by: NURSE PRACTITIONER

## 2022-07-20 PROCEDURE — 99214 OFFICE O/P EST MOD 30 MIN: CPT | Performed by: NURSE PRACTITIONER

## 2022-07-20 PROCEDURE — 86140 C-REACTIVE PROTEIN: CPT | Performed by: NURSE PRACTITIONER

## 2022-07-20 PROCEDURE — 80053 COMPREHEN METABOLIC PANEL: CPT | Performed by: NURSE PRACTITIONER

## 2022-07-20 PROCEDURE — 85025 COMPLETE CBC W/AUTO DIFF WBC: CPT | Performed by: NURSE PRACTITIONER

## 2022-07-20 RX ORDER — PREDNISONE 1 MG/1
TABLET ORAL
Qty: 21 TABLET | Refills: 0 | Status: SHIPPED | OUTPATIENT
Start: 2022-07-20 | End: 2022-10-11

## 2022-07-20 RX ORDER — AZITHROMYCIN 250 MG/1
TABLET, FILM COATED ORAL
Qty: 6 TABLET | Refills: 0 | Status: SHIPPED | OUTPATIENT
Start: 2022-07-20 | End: 2022-10-11

## 2022-07-20 RX ORDER — OFLOXACIN 3 MG/ML
5 SOLUTION AURICULAR (OTIC) 2 TIMES DAILY
Qty: 5 ML | Refills: 0 | Status: SHIPPED | OUTPATIENT
Start: 2022-07-20 | End: 2022-07-27

## 2022-07-20 RX ORDER — ATORVASTATIN CALCIUM 20 MG/1
TABLET, FILM COATED ORAL
Qty: 90 TABLET | Refills: 1 | Status: SHIPPED | OUTPATIENT
Start: 2022-07-20 | End: 2023-01-12

## 2022-07-20 NOTE — PROGRESS NOTES
"Subjective   Chief Complaint   Patient presents with   • Cough     Patient said he had on July 5, 2022 still having symptoms   • Headache      Glen Sousa is a 61 y.o. male.     The patient is here today for a cough.    Post COVID-19- The patient was diagnosed with COVID-19 in the beginning of 07/2022. He states his son and wife also had COVID-19. He complains of having a cough and congestion that has persisted. He was experiencing a productive cough; however, he has been experiencing a dry cough since the afternoon of 07/19/2022. He also complains of having a headache, and a bilateral ear \"pop\" and ringing, which he has been experiencing for approximately 2 weeks. His body aches have improved since last week. The patient has been taking OTC Mucinex and Advil. He denies taking any recent antibiotic. He denies known allergies to medications. He takes antibiotics at least once per year. He denies GI issues. Z-Salty's are normally beneficial for the patient. He has been on prednisone in the past. He denies being a diabetic.     I have reviewed the following portions of the patient's history and confirmed they are accurate: allergies, current medications, past family history, past medical history, past social history, past surgical history, and problem list    I have personally completed the patient's review of systems.    Review of Systems   Constitutional: Positive for activity change and fatigue. Negative for appetite change, chills, diaphoresis, fever, unexpected weight gain and unexpected weight loss.   HENT: Positive for congestion, ear pain, postnasal drip, rhinorrhea, sinus pressure and sore throat. Negative for ear discharge, mouth sores, nosebleeds and sneezing.    Eyes: Negative for pain, discharge and itching.   Respiratory: Positive for cough, chest tightness and shortness of breath. Negative for wheezing.    Cardiovascular: Negative for chest pain, palpitations and leg swelling.   Gastrointestinal: " "Negative for abdominal pain, constipation, diarrhea, nausea and vomiting.   Musculoskeletal: Positive for myalgias. Negative for gait problem.   Skin: Negative for rash.   Allergic/Immunologic: Negative for immunocompromised state.   Neurological: Positive for headache. Negative for seizures, speech difficulty, weakness and numbness.   Hematological: Negative for adenopathy.       Current Outpatient Medications on File Prior to Visit   Medication Sig   • atorvastatin (LIPITOR) 20 MG tablet TAKE ONE (1) TABLET BY MOUTH NEVERY NIGHT   • meloxicam (MOBIC) 7.5 MG tablet Take 1 tablet by mouth Daily As Needed for Moderate Pain .   • sildenafil (Viagra) 100 MG tablet Take 1 tablet by mouth Daily As Needed for Erectile Dysfunction.   • tiZANidine (ZANAFLEX) 4 MG tablet Take 1 tablet by mouth At Night As Needed for Muscle Spasms.   • vitamin D (ERGOCALCIFEROL) 51704 units capsule capsule Take 1 capsule by mouth 1 (One) Time Per Week.     No current facility-administered medications on file prior to visit.       Objective   Vitals:    07/20/22 0802   BP: 122/78   BP Location: Left arm   Patient Position: Sitting   Pulse: 72   Temp: 96.9 °F (36.1 °C)   TempSrc: Temporal   SpO2: 97%   Weight: 107 kg (236 lb 3.2 oz)   Height: 182.9 cm (72.01\")     Body mass index is 32.03 kg/m².    Physical Exam  Vitals reviewed.   Constitutional:       Appearance: Normal appearance. He is well-developed.   HENT:      Head: Normocephalic and atraumatic.      Right Ear: Swelling present. Tympanic membrane is injected, erythematous and bulging.      Left Ear: Tympanic membrane is injected, erythematous and bulging.      Nose: Mucosal edema, congestion and rhinorrhea present.      Right Sinus: Maxillary sinus tenderness and frontal sinus tenderness present.      Left Sinus: Maxillary sinus tenderness and frontal sinus tenderness present.      Mouth/Throat:      Pharynx: Uvula midline. Posterior oropharyngeal erythema present.   Eyes:      Pupils: " Pupils are equal, round, and reactive to light.   Neck:      Thyroid: No thyromegaly.      Trachea: Trachea normal.   Cardiovascular:      Rate and Rhythm: Normal rate and regular rhythm.      Heart sounds: Normal heart sounds.   Pulmonary:      Effort: Pulmonary effort is normal.      Breath sounds: Examination of the right-middle field reveals rhonchi. Examination of the right-lower field reveals rhonchi. Examination of the left-lower field reveals rhonchi. Rhonchi present.   Musculoskeletal:         General: Normal range of motion.      Cervical back: Normal range of motion.   Skin:     General: Skin is warm and dry.   Neurological:      Mental Status: He is alert and oriented to person, place, and time.      GCS: GCS eye subscore is 4. GCS verbal subscore is 5. GCS motor subscore is 6.   Psychiatric:         Attention and Perception: Attention normal.         Mood and Affect: Mood normal.         Speech: Speech normal.         Behavior: Behavior normal. Behavior is cooperative.         Assessment & Plan   Problem List Items Addressed This Visit    None     Visit Diagnoses     Upper respiratory tract infection, unspecified type    -  Primary      Relevant Medications    azithromycin (ZITHROMAX) 250 MG tablet    predniSONE (DELTASONE) 5 MG tablet    Other Relevant Orders    CBC Auto Differential    Comprehensive Metabolic Panel    Sedimentation Rate    C-reactive Protein    Other non-recurrent acute nonsuppurative otitis media of both ears        Relevant Medications    azithromycin (ZITHROMAX) 250 MG tablet    ofloxacin (FLOXIN) 0.3 % otic solution    predniSONE (DELTASONE) 5 MG tablet    Post-COVID syndrome        Relevant Medications    azithromycin (ZITHROMAX) 250 MG tablet    predniSONE (DELTASONE) 5 MG tablet    Other Relevant Orders    CBC Auto Differential    Comprehensive Metabolic Panel    Sedimentation Rate    C-reactive Protein       1. Upper respiratory infection  - New, unstable.   - Start  azithromycin and prednisone pack.  - Labs being ordered today.   - If no improvement, we will order chest x-ray.    2. Acute otitis media  - New, unstable.   - Start azithromycin, prednisone pack, and ofloxacin eardrops.    3. Post COVID-19 syndrome  - New, unstable.   - Start azithromycin and prednisone pack.   - Ordering labs to further evaluate blood counts, kidney function, liver function, and inflammation post COVID-19.      Current Outpatient Medications:   •  atorvastatin (LIPITOR) 20 MG tablet, TAKE ONE (1) TABLET BY MOUTH NEVERY NIGHT, Disp: 150 tablet, Rfl: 0  •  meloxicam (MOBIC) 7.5 MG tablet, Take 1 tablet by mouth Daily As Needed for Moderate Pain ., Disp: 30 tablet, Rfl: 2  •  sildenafil (Viagra) 100 MG tablet, Take 1 tablet by mouth Daily As Needed for Erectile Dysfunction., Disp: 30 tablet, Rfl: 5  •  tiZANidine (ZANAFLEX) 4 MG tablet, Take 1 tablet by mouth At Night As Needed for Muscle Spasms., Disp: 30 tablet, Rfl: 2  •  vitamin D (ERGOCALCIFEROL) 31180 units capsule capsule, Take 1 capsule by mouth 1 (One) Time Per Week., Disp: 12 capsule, Rfl: 1  •  azithromycin (ZITHROMAX) 250 MG tablet, Take 2 tablets the first day, then 1 tablet daily for 4 days., Disp: 6 tablet, Rfl: 0  •  ofloxacin (FLOXIN) 0.3 % otic solution, Administer 5 drops into both ears 2 (Two) Times a Day for 7 days., Disp: 5 mL, Rfl: 0  •  predniSONE (DELTASONE) 5 MG tablet, Take as directed on package instruction - 6 day taper., Disp: 21 tablet, Rfl: 0       Plan of care reviewed with the patient at the conclusion of today's visit.  Education was provided regarding diagnosis, management, and any prescribed or recommended OTC medications.  Patient verbalized understanding of and agreement with management plan.     Return if symptoms worsen or fail to improve.      Transcribed from ambient dictation for SHAWNA Arguelles by KAYE ZHOU.  07/20/22   09:14 EDT    Patient verbalized consent to the visit  recording.

## 2022-07-22 ENCOUNTER — PATIENT ROUNDING (BHMG ONLY) (OUTPATIENT)
Dept: INTERNAL MEDICINE | Facility: CLINIC | Age: 62
End: 2022-07-22

## 2022-07-22 NOTE — PROGRESS NOTES
A  my chart message has been sent to the patient for patient rounding [place] [facility]

## 2022-07-22 NOTE — PROGRESS NOTES
A  my chart message has been sent to the patient for patient rounding with [facility] [place].

## 2022-10-11 ENCOUNTER — OFFICE VISIT (OUTPATIENT)
Dept: INTERNAL MEDICINE | Facility: CLINIC | Age: 62
End: 2022-10-11

## 2022-10-11 VITALS
SYSTOLIC BLOOD PRESSURE: 130 MMHG | DIASTOLIC BLOOD PRESSURE: 74 MMHG | HEART RATE: 76 BPM | WEIGHT: 231.2 LBS | BODY MASS INDEX: 31.31 KG/M2 | TEMPERATURE: 97.3 F | HEIGHT: 72 IN | OXYGEN SATURATION: 98 %

## 2022-10-11 DIAGNOSIS — H66.005 RECURRENT ACUTE SUPPURATIVE OTITIS MEDIA WITHOUT SPONTANEOUS RUPTURE OF LEFT TYMPANIC MEMBRANE: ICD-10-CM

## 2022-10-11 DIAGNOSIS — J06.9 UPPER RESPIRATORY TRACT INFECTION, UNSPECIFIED TYPE: Primary | ICD-10-CM

## 2022-10-11 LAB
EXPIRATION DATE: NORMAL
FLUAV AG UPPER RESP QL IA.RAPID: NOT DETECTED
FLUBV AG UPPER RESP QL IA.RAPID: NOT DETECTED
INTERNAL CONTROL: NORMAL
Lab: NORMAL
SARS-COV-2 AG UPPER RESP QL IA.RAPID: NOT DETECTED

## 2022-10-11 PROCEDURE — 99213 OFFICE O/P EST LOW 20 MIN: CPT | Performed by: NURSE PRACTITIONER

## 2022-10-11 PROCEDURE — 87428 SARSCOV & INF VIR A&B AG IA: CPT | Performed by: NURSE PRACTITIONER

## 2022-10-11 RX ORDER — AMOXICILLIN AND CLAVULANATE POTASSIUM 875; 125 MG/1; MG/1
1 TABLET, FILM COATED ORAL 2 TIMES DAILY
Qty: 20 TABLET | Refills: 0 | Status: SHIPPED | OUTPATIENT
Start: 2022-10-11 | End: 2022-10-19

## 2022-10-11 RX ORDER — PREDNISONE 1 MG/1
TABLET ORAL
Qty: 21 TABLET | Refills: 0 | Status: SHIPPED | OUTPATIENT
Start: 2022-10-11 | End: 2022-10-19

## 2022-10-11 NOTE — PROGRESS NOTES
Subjective   Chief Complaint   Patient presents with   • Cough   • Sore Throat   • Earache      Glen Sousa is a 62 y.o. male.     The patient is here today for a same day sick visit.     Upper respiratory infection  The patient reports that he has been experiencing symptoms since 10/06/2022. He notes that its mainly sinus related along with a dry cough. He states that he took an at home COVID-19 test that came back negative. He reports that he has been taking Mucinex day and night. He denies any known exposure to strep throat. He reports that he has been using Flonase occasionally. He denies taking an antihistamine.    Left ear pain  The patient reports that his left ear has not cleared up since the last visit. He reports that he is experiencing weird sensations in his ear.      I have reviewed the following portions of the patient's history and confirmed they are accurate: allergies, current medications, past family history, past medical history, past social history, past surgical history, and problem list    I have personally completed the patient's review of systems.    Review of Systems   Constitutional: Positive for activity change and fatigue. Negative for appetite change, chills, diaphoresis, fever, unexpected weight gain and unexpected weight loss.   HENT: Positive for congestion, ear pain, postnasal drip, rhinorrhea, sinus pressure and sore throat. Negative for ear discharge, mouth sores, nosebleeds and sneezing.    Eyes: Negative for pain, discharge and itching.   Respiratory: Positive for cough, chest tightness and shortness of breath. Negative for wheezing.    Cardiovascular: Negative for chest pain, palpitations and leg swelling.   Gastrointestinal: Negative for abdominal pain, constipation, diarrhea, nausea and vomiting.   Musculoskeletal: Positive for myalgias. Negative for gait problem.   Skin: Negative for rash.   Allergic/Immunologic: Negative for immunocompromised state.   Neurological:  "Positive for headache. Negative for seizures, speech difficulty, weakness and numbness.   Hematological: Negative for adenopathy.       Current Outpatient Medications on File Prior to Visit   Medication Sig   • atorvastatin (LIPITOR) 20 MG tablet TAKE ONE (1) TABLET BY MOUTH NEVERY NIGHT   • meloxicam (MOBIC) 7.5 MG tablet Take 1 tablet by mouth Daily As Needed for Moderate Pain .   • sildenafil (Viagra) 100 MG tablet Take 1 tablet by mouth Daily As Needed for Erectile Dysfunction.   • tiZANidine (ZANAFLEX) 4 MG tablet Take 1 tablet by mouth At Night As Needed for Muscle Spasms.   • vitamin D (ERGOCALCIFEROL) 88529 units capsule capsule Take 1 capsule by mouth 1 (One) Time Per Week.     No current facility-administered medications on file prior to visit.       Objective   Vitals:    10/11/22 1133   BP: 130/74   Pulse: 76   Temp: 97.3 °F (36.3 °C)   TempSrc: Temporal   SpO2: 98%   Weight: 105 kg (231 lb 3.2 oz)   Height: 182.9 cm (72\")     Body mass index is 31.36 kg/m².    Physical Exam  Vitals reviewed.   Constitutional:       Appearance: Normal appearance. He is well-developed.   HENT:      Head: Normocephalic and atraumatic.      Right Ear: Tympanic membrane is erythematous.      Left Ear: Tympanic membrane is erythematous.      Nose: Mucosal edema, congestion and rhinorrhea present.      Right Sinus: Maxillary sinus tenderness and frontal sinus tenderness present.      Left Sinus: Maxillary sinus tenderness and frontal sinus tenderness present.      Mouth/Throat:      Pharynx: Uvula midline. Posterior oropharyngeal erythema present.   Eyes:      General: Lids are normal.      Conjunctiva/sclera: Conjunctivae normal.      Pupils: Pupils are equal, round, and reactive to light.   Neck:      Thyroid: No thyromegaly.      Trachea: Trachea normal.   Cardiovascular:      Rate and Rhythm: Normal rate and regular rhythm.      Heart sounds: Normal heart sounds.   Pulmonary:      Effort: Pulmonary effort is normal. No " respiratory distress.      Breath sounds: Normal breath sounds and air entry.   Musculoskeletal:         General: Normal range of motion.      Cervical back: Normal range of motion.   Skin:     General: Skin is warm and dry.   Neurological:      Mental Status: He is alert and oriented to person, place, and time.      GCS: GCS eye subscore is 4. GCS verbal subscore is 5. GCS motor subscore is 6.   Psychiatric:         Attention and Perception: Attention normal.         Mood and Affect: Mood normal.         Speech: Speech normal.         Behavior: Behavior normal. Behavior is cooperative.         Assessment & Plan   Problem List Items Addressed This Visit    None  Visit Diagnoses     Upper respiratory tract infection, unspecified type    -  Primary    Relevant Orders    POCT SARS-CoV-2 Antigen ERNESTINE (Completed)    Recurrent acute suppurative otitis media without spontaneous rupture of left tympanic membrane                 Current Outpatient Medications:   •  atorvastatin (LIPITOR) 20 MG tablet, TAKE ONE (1) TABLET BY MOUTH NEVERY NIGHT, Disp: 90 tablet, Rfl: 1  •  meloxicam (MOBIC) 7.5 MG tablet, Take 1 tablet by mouth Daily As Needed for Moderate Pain ., Disp: 30 tablet, Rfl: 2  •  sildenafil (Viagra) 100 MG tablet, Take 1 tablet by mouth Daily As Needed for Erectile Dysfunction., Disp: 30 tablet, Rfl: 5  •  tiZANidine (ZANAFLEX) 4 MG tablet, Take 1 tablet by mouth At Night As Needed for Muscle Spasms., Disp: 30 tablet, Rfl: 2  •  vitamin D (ERGOCALCIFEROL) 46881 units capsule capsule, Take 1 capsule by mouth 1 (One) Time Per Week., Disp: 12 capsule, Rfl: 1     PLAN  Upper respiratory infection  The patient will start Augmentin and prednisone pack.    Recurrent otitis media of left ear: New, unstable.  The patient will start Augmentin and prednisone. He will start Flonase and Allegra. The patient has considered referring to ENT for consult due to past history of left otitis media.    Plan of care reviewed with the  patient at the conclusion of today's visit.  Education was provided regarding diagnosis, management, and any prescribed or recommended OTC medications.  Patient verbalized understanding of and agreement with management plan.     No follow-ups on file.      Transcribed from ambient dictation for SHAWNA Arguelles by Cleo Rust.  10/11/22   12:50 EDT    Patient or patient representative verbalized consent to the visit recording.  I have personally performed the services described in this document as transcribed by the above individual, and it is both accurate and complete.   Cleo Rust

## 2022-10-19 ENCOUNTER — LAB (OUTPATIENT)
Dept: LAB | Facility: HOSPITAL | Age: 62
End: 2022-10-19

## 2022-10-19 ENCOUNTER — OFFICE VISIT (OUTPATIENT)
Dept: INTERNAL MEDICINE | Facility: CLINIC | Age: 62
End: 2022-10-19

## 2022-10-19 VITALS
DIASTOLIC BLOOD PRESSURE: 76 MMHG | WEIGHT: 228 LBS | SYSTOLIC BLOOD PRESSURE: 130 MMHG | BODY MASS INDEX: 30.88 KG/M2 | TEMPERATURE: 96.8 F | HEART RATE: 72 BPM | RESPIRATION RATE: 14 BRPM | HEIGHT: 72 IN

## 2022-10-19 DIAGNOSIS — Z12.5 SCREENING FOR PROSTATE CANCER: ICD-10-CM

## 2022-10-19 DIAGNOSIS — Z00.00 ROUTINE GENERAL MEDICAL EXAMINATION AT A HEALTH CARE FACILITY: ICD-10-CM

## 2022-10-19 DIAGNOSIS — E78.49 OTHER HYPERLIPIDEMIA: Primary | ICD-10-CM

## 2022-10-19 DIAGNOSIS — E55.9 VITAMIN D DEFICIENCY: ICD-10-CM

## 2022-10-19 DIAGNOSIS — N40.0 BENIGN PROSTATIC HYPERPLASIA WITHOUT LOWER URINARY TRACT SYMPTOMS: ICD-10-CM

## 2022-10-19 DIAGNOSIS — Z12.11 SCREEN FOR COLON CANCER: ICD-10-CM

## 2022-10-19 LAB
BILIRUB BLD-MCNC: NEGATIVE MG/DL
CLARITY, POC: CLEAR
COLOR UR: YELLOW
DEVELOPER EXPIRATION DATE: NORMAL
DEVELOPER LOT NUMBER: NORMAL
EXPIRATION DATE: NORMAL
EXPIRATION DATE: NORMAL
FECAL OCCULT BLOOD SCREEN, POC: NEGATIVE
GLUCOSE UR STRIP-MCNC: NEGATIVE MG/DL
KETONES UR QL: NEGATIVE
LEUKOCYTE EST, POC: NEGATIVE
Lab: NORMAL
Lab: NORMAL
NEGATIVE CONTROL: NEGATIVE
NITRITE UR-MCNC: NEGATIVE MG/ML
PH UR: 6 [PH] (ref 5–8)
POSITIVE CONTROL: POSITIVE
PROT UR STRIP-MCNC: NEGATIVE MG/DL
RBC # UR STRIP: NEGATIVE /UL
SP GR UR: 1.02 (ref 1–1.03)
UROBILINOGEN UR QL: NORMAL

## 2022-10-19 PROCEDURE — 84443 ASSAY THYROID STIM HORMONE: CPT | Performed by: INTERNAL MEDICINE

## 2022-10-19 PROCEDURE — 80061 LIPID PANEL: CPT | Performed by: INTERNAL MEDICINE

## 2022-10-19 PROCEDURE — G0103 PSA SCREENING: HCPCS | Performed by: INTERNAL MEDICINE

## 2022-10-19 PROCEDURE — 85027 COMPLETE CBC AUTOMATED: CPT | Performed by: INTERNAL MEDICINE

## 2022-10-19 PROCEDURE — 81003 URINALYSIS AUTO W/O SCOPE: CPT | Performed by: INTERNAL MEDICINE

## 2022-10-19 PROCEDURE — 82270 OCCULT BLOOD FECES: CPT | Performed by: INTERNAL MEDICINE

## 2022-10-19 PROCEDURE — 80053 COMPREHEN METABOLIC PANEL: CPT | Performed by: INTERNAL MEDICINE

## 2022-10-19 PROCEDURE — 99396 PREV VISIT EST AGE 40-64: CPT | Performed by: INTERNAL MEDICINE

## 2022-10-19 NOTE — PROGRESS NOTES
Patient is a 62 y.o. male who is here for a physical.  Chief Complaint   Patient presents with   • Annual Exam         HPI:    Here for CPE.     History:     Patient Active Problem List   Diagnosis   • Hyperlipidemia   • Male erectile disorder   • FARIDEH on CPAP   • Allergic rhinitis   • Arthritis   • Vitamin D deficiency   • Routine general medical examination at a health care facility   • Tenderness of lumbar region       Past Medical History:   Diagnosis Date   • Acute sinusitis     Getting over sinus infection. Symptoms better with augmentin.   • Elbow enthesopathy    • Hyperlipoproteinemia type IV    • Overweight    • Plantar fasciitis    • Vitamin D deficiency        Past Surgical History:   Procedure Laterality Date   • LASIK         Current Outpatient Medications on File Prior to Visit   Medication Sig   • atorvastatin (LIPITOR) 20 MG tablet TAKE ONE (1) TABLET BY MOUTH NEVERY NIGHT   • meloxicam (MOBIC) 7.5 MG tablet Take 1 tablet by mouth Daily As Needed for Moderate Pain .   • sildenafil (Viagra) 100 MG tablet Take 1 tablet by mouth Daily As Needed for Erectile Dysfunction.   • tiZANidine (ZANAFLEX) 4 MG tablet Take 1 tablet by mouth At Night As Needed for Muscle Spasms.   • vitamin D (ERGOCALCIFEROL) 70293 units capsule capsule Take 1 capsule by mouth 1 (One) Time Per Week.     No current facility-administered medications on file prior to visit.       Family History   Problem Relation Age of Onset   • Diabetes Other    • Hypertension Other    • Hyperlipidemia Other    • Cancer Other    • Stroke Other    • Arthritis Other    • Graves' disease Other    • Diverticulosis Other         of intestine   • Breast cancer Other        Social History     Socioeconomic History   • Marital status:    Tobacco Use   • Smoking status: Never   • Smokeless tobacco: Never   Substance and Sexual Activity   • Alcohol use: Yes     Comment:  · 1 beer weekly         Review of Systems   Constitutional: Negative for chills,  "fatigue, fever and unexpected weight change.   HENT: Negative for ear pain, hearing loss, sore throat and trouble swallowing.    Eyes: Negative for discharge and itching.   Respiratory: Negative for cough, chest tightness and shortness of breath.    Cardiovascular: Negative for chest pain, palpitations and leg swelling.   Gastrointestinal: Negative for abdominal pain, blood in stool, constipation, diarrhea and vomiting.        12/13 colonoscopy normal  6/19 colonoscopy normal   Endocrine: Negative for polydipsia and polyuria.   Genitourinary: Negative for difficulty urinating, dysuria, enuresis, frequency, hematuria and urgency.        10/22 psa   Musculoskeletal: Positive for arthralgias and back pain. Negative for gait problem and joint swelling.   Skin: Negative for rash and wound.   Allergic/Immunologic: Negative for immunocompromised state.   Neurological: Negative for dizziness, syncope, weakness, light-headedness, numbness and headaches.   Hematological: Does not bruise/bleed easily.   Psychiatric/Behavioral: Negative for behavioral problems, dysphoric mood and sleep disturbance. The patient is not nervous/anxious.        /76   Pulse 72   Temp 96.8 °F (36 °C) (Infrared)   Resp 14   Ht 182.9 cm (72.01\")   Wt 103 kg (228 lb)   BMI 30.92 kg/m²       Physical Exam  Constitutional:       Appearance: Normal appearance. He is well-developed.   HENT:      Head: Normocephalic and atraumatic.      Right Ear: External ear normal.      Left Ear: External ear normal.      Nose: Nose normal.      Mouth/Throat:      Mouth: Mucous membranes are moist.      Pharynx: Oropharynx is clear.   Eyes:      Extraocular Movements: Extraocular movements intact.      Conjunctiva/sclera: Conjunctivae normal.      Pupils: Pupils are equal, round, and reactive to light.   Cardiovascular:      Rate and Rhythm: Normal rate and regular rhythm.      Pulses: Normal pulses.      Heart sounds: Normal heart sounds.   Pulmonary:      " Effort: Pulmonary effort is normal.      Breath sounds: Normal breath sounds.   Abdominal:      General: Bowel sounds are normal.      Palpations: Abdomen is soft.   Genitourinary:     Prostate: Normal.      Rectum: Normal.   Musculoskeletal:         General: Normal range of motion.      Cervical back: Normal range of motion and neck supple.   Lymphadenopathy:      Cervical: No cervical adenopathy.   Skin:     General: Skin is warm and dry.   Neurological:      General: No focal deficit present.      Mental Status: He is alert and oriented to person, place, and time.   Psychiatric:         Mood and Affect: Mood normal.         Behavior: Behavior normal.         Thought Content: Thought content normal.         Procedure:      Discussion/Summary:        HME-counseled on diet and exercise, fasting labs today  hyperlipidemia-labs today worsened, counseled on diet, he reports compliant with diet and meds and surprised values increased, will recheck in 3 months  tara-cont cpap, stable  rhinitis-advised compliance with flonase and prn allegra, stable  ED-sildenafil prn  Vit d def-recheck on target, cont replacement     10/19 labs noted and dw patient, recheck psa in 3 months     Reviewed the following with the patient: advised patient to avoid alcoholic beverages, encouraged patient to exercise 5-7 days per week for 30 minutes at a time, ideal body weight discussed with patient and weight loss encouraged.    Current Outpatient Medications:   •  atorvastatin (LIPITOR) 20 MG tablet, TAKE ONE (1) TABLET BY MOUTH NEVERY NIGHT, Disp: 90 tablet, Rfl: 1  •  meloxicam (MOBIC) 7.5 MG tablet, Take 1 tablet by mouth Daily As Needed for Moderate Pain ., Disp: 30 tablet, Rfl: 2  •  sildenafil (Viagra) 100 MG tablet, Take 1 tablet by mouth Daily As Needed for Erectile Dysfunction., Disp: 30 tablet, Rfl: 5  •  tiZANidine (ZANAFLEX) 4 MG tablet, Take 1 tablet by mouth At Night As Needed for Muscle Spasms., Disp: 30 tablet, Rfl: 2  •   vitamin D (ERGOCALCIFEROL) 99002 units capsule capsule, Take 1 capsule by mouth 1 (One) Time Per Week., Disp: 12 capsule, Rfl: 1        Diagnoses and all orders for this visit:    1. Other hyperlipidemia (Primary)  -     Lipid Panel  -     Comprehensive Metabolic Panel; Future  -     Lipid Panel; Future    2. Vitamin D deficiency    3. Routine general medical examination at a health care facility  -     CBC (No Diff)  -     Comprehensive Metabolic Panel  -     Lipid Panel  -     PSA Screen  -     TSH  -     POC Occult Blood Stool  -     POC Urinalysis Dipstick, Automated    4. Screening for prostate cancer  -     PSA Screen    5. Screen for colon cancer  -     POC Occult Blood Stool    6. Benign prostatic hyperplasia without lower urinary tract symptoms  -     PSA DIAGNOSTIC; Future

## 2022-10-20 LAB
ALBUMIN SERPL-MCNC: 4.3 G/DL (ref 3.5–5.2)
ALBUMIN/GLOB SERPL: 1.5 G/DL
ALP SERPL-CCNC: 71 U/L (ref 39–117)
ALT SERPL W P-5'-P-CCNC: 37 U/L (ref 1–41)
ANION GAP SERPL CALCULATED.3IONS-SCNC: 16.5 MMOL/L (ref 5–15)
AST SERPL-CCNC: 37 U/L (ref 1–40)
BILIRUB SERPL-MCNC: 0.8 MG/DL (ref 0–1.2)
BUN SERPL-MCNC: 13 MG/DL (ref 8–23)
BUN/CREAT SERPL: 14.4 (ref 7–25)
CALCIUM SPEC-SCNC: 9.6 MG/DL (ref 8.6–10.5)
CHLORIDE SERPL-SCNC: 97 MMOL/L (ref 98–107)
CHOLEST SERPL-MCNC: 227 MG/DL (ref 0–200)
CO2 SERPL-SCNC: 26.5 MMOL/L (ref 22–29)
CREAT SERPL-MCNC: 0.9 MG/DL (ref 0.76–1.27)
DEPRECATED RDW RBC AUTO: 46.5 FL (ref 37–54)
EGFRCR SERPLBLD CKD-EPI 2021: 97.8 ML/MIN/1.73
ERYTHROCYTE [DISTWIDTH] IN BLOOD BY AUTOMATED COUNT: 13 % (ref 12.3–15.4)
GLOBULIN UR ELPH-MCNC: 2.8 GM/DL
GLUCOSE SERPL-MCNC: 87 MG/DL (ref 65–99)
HCT VFR BLD AUTO: 51 % (ref 37.5–51)
HDLC SERPL-MCNC: 40 MG/DL (ref 40–60)
HGB BLD-MCNC: 17.3 G/DL (ref 13–17.7)
LDLC SERPL CALC-MCNC: 128 MG/DL (ref 0–100)
LDLC/HDLC SERPL: 3.03 {RATIO}
MCH RBC QN AUTO: 32.8 PG (ref 26.6–33)
MCHC RBC AUTO-ENTMCNC: 33.9 G/DL (ref 31.5–35.7)
MCV RBC AUTO: 96.6 FL (ref 79–97)
PLATELET # BLD AUTO: 229 10*3/MM3 (ref 140–450)
PMV BLD AUTO: 10.4 FL (ref 6–12)
POTASSIUM SERPL-SCNC: 4.5 MMOL/L (ref 3.5–5.2)
PROT SERPL-MCNC: 7.1 G/DL (ref 6–8.5)
PSA SERPL-MCNC: 3.87 NG/ML (ref 0–4)
RBC # BLD AUTO: 5.28 10*6/MM3 (ref 4.14–5.8)
SODIUM SERPL-SCNC: 140 MMOL/L (ref 136–145)
TRIGL SERPL-MCNC: 330 MG/DL (ref 0–150)
TSH SERPL DL<=0.05 MIU/L-ACNC: 5.34 UIU/ML (ref 0.27–4.2)
VLDLC SERPL-MCNC: 59 MG/DL (ref 5–40)
WBC NRBC COR # BLD: 9.26 10*3/MM3 (ref 3.4–10.8)

## 2023-01-12 RX ORDER — ATORVASTATIN CALCIUM 20 MG/1
TABLET, FILM COATED ORAL
Qty: 90 TABLET | Refills: 1 | Status: SHIPPED | OUTPATIENT
Start: 2023-01-12

## 2023-01-12 NOTE — TELEPHONE ENCOUNTER
Rx Refill Note  Requested Prescriptions     Pending Prescriptions Disp Refills   • atorvastatin (LIPITOR) 20 MG tablet [Pharmacy Med Name: ATORVASTATIN CALCIUM 20MG TABLET] 90 tablet 1     Sig: TAKE ONE (1) TABLET BY MOUTH NEVERY NIGHT      Last office visit with prescribing clinician: 10/19/2022   Last telemedicine visit with prescribing clinician: Visit date not found   Next office visit with prescribing clinician: Visit date not found        10/20/2022                 Barb Gross MA  01/12/23, 08:16 EST

## 2023-03-21 ENCOUNTER — OFFICE VISIT (OUTPATIENT)
Dept: INTERNAL MEDICINE | Facility: CLINIC | Age: 63
End: 2023-03-21
Payer: COMMERCIAL

## 2023-03-21 VITALS
SYSTOLIC BLOOD PRESSURE: 128 MMHG | HEART RATE: 82 BPM | BODY MASS INDEX: 31.83 KG/M2 | WEIGHT: 235 LBS | HEIGHT: 72 IN | DIASTOLIC BLOOD PRESSURE: 84 MMHG | OXYGEN SATURATION: 98 %

## 2023-03-21 DIAGNOSIS — J06.9 UPPER RESPIRATORY TRACT INFECTION, UNSPECIFIED TYPE: Primary | ICD-10-CM

## 2023-03-21 LAB
EXPIRATION DATE: NORMAL
EXPIRATION DATE: NORMAL
FLUAV AG UPPER RESP QL IA.RAPID: NOT DETECTED
FLUBV AG UPPER RESP QL IA.RAPID: NOT DETECTED
INTERNAL CONTROL: NORMAL
INTERNAL CONTROL: NORMAL
Lab: NORMAL
Lab: NORMAL
S PYO AG THROAT QL: NEGATIVE
SARS-COV-2 AG UPPER RESP QL IA.RAPID: NOT DETECTED

## 2023-03-21 PROCEDURE — 99213 OFFICE O/P EST LOW 20 MIN: CPT | Performed by: INTERNAL MEDICINE

## 2023-03-21 PROCEDURE — 87880 STREP A ASSAY W/OPTIC: CPT | Performed by: INTERNAL MEDICINE

## 2023-03-21 PROCEDURE — 87428 SARSCOV & INF VIR A&B AG IA: CPT | Performed by: INTERNAL MEDICINE

## 2023-03-21 RX ORDER — AZITHROMYCIN 250 MG/1
TABLET, FILM COATED ORAL
Qty: 6 TABLET | Refills: 0 | Status: SHIPPED | OUTPATIENT
Start: 2023-03-21

## 2023-03-21 RX ORDER — METHYLPREDNISOLONE 4 MG/1
TABLET ORAL
Qty: 21 EACH | Refills: 0 | Status: SHIPPED | OUTPATIENT
Start: 2023-03-21

## 2023-03-21 NOTE — PROGRESS NOTES
"Subjective   Glen Sousa is a 62 y.o. male here for URI symptoms for 1 week.  He does have an upcoming business trip so would like to be well before that.  He gets frequent sinus infections.  He has taken 2 COVID test which have been negative.  He has sore throat, sinus congestion and pressure, mild cough.  No fever chills or loss of taste or smell.  No GI symptoms.    I have reviewed the following portions of the patient's history and confirmed they are accurate: allergies, current medications, past medical history, past social history and problem list     I have personally reviewed and performed the ROS. Iliana Galaviz MD     Review of Systems:  General: negative  HEENT: See HPI  Respiratory: Cough  GI: Negative    Objective   /84 (BP Location: Left arm, Patient Position: Sitting)   Pulse 82   Ht 182.9 cm (72\")   Wt 107 kg (235 lb)   SpO2 98%   BMI 31.87 kg/m²     Physical Exam  Vitals reviewed.   Constitutional:       Appearance: He is well-developed.   HENT:      Mouth/Throat:      Mouth: Mucous membranes are moist.      Pharynx: Oropharynx is clear. No pharyngeal swelling or posterior oropharyngeal erythema.   Pulmonary:      Effort: Pulmonary effort is normal.   Skin:     General: Skin is warm and dry.   Neurological:      Mental Status: He is alert and oriented to person, place, and time.   Psychiatric:         Behavior: Behavior normal.         Thought Content: Thought content normal.         Judgment: Judgment normal.         Assessment & Plan   Diagnoses and all orders for this visit:    1. Upper respiratory tract infection, unspecified type (Primary)  -     POCT SARS-CoV-2 Antigen ERNESTINE + Flu  -     POCT rapid strep A  -     azithromycin (Zithromax Z-Salty) 250 MG tablet; Take 2 tablets the first day, then 1 tablet daily for 4 days.  Dispense: 6 tablet; Refill: 0  -     methylPREDNISolone (MEDROL) 4 MG dose pack; Take as directed on package instructions.  Dispense: 21 each; Refill: " 0  -Will take steroid if not better in a few days  -Negative strep, flu, COVID             Iliana Galaviz MD

## 2023-04-17 RX ORDER — ATORVASTATIN CALCIUM 20 MG/1
TABLET, FILM COATED ORAL
Qty: 90 TABLET | Refills: 1 | Status: SHIPPED | OUTPATIENT
Start: 2023-04-17

## 2023-10-23 ENCOUNTER — LAB (OUTPATIENT)
Dept: INTERNAL MEDICINE | Facility: CLINIC | Age: 63
End: 2023-10-23
Payer: COMMERCIAL

## 2023-10-23 DIAGNOSIS — Z12.5 SCREENING FOR PROSTATE CANCER: ICD-10-CM

## 2023-10-23 DIAGNOSIS — R73.09 ELEVATED GLUCOSE: ICD-10-CM

## 2023-10-23 DIAGNOSIS — Z00.00 ROUTINE GENERAL MEDICAL EXAMINATION AT A HEALTH CARE FACILITY: ICD-10-CM

## 2023-10-23 DIAGNOSIS — E78.49 OTHER HYPERLIPIDEMIA: Primary | ICD-10-CM

## 2023-10-23 LAB
DEPRECATED RDW RBC AUTO: 45.3 FL (ref 37–54)
ERYTHROCYTE [DISTWIDTH] IN BLOOD BY AUTOMATED COUNT: 13.1 % (ref 12.3–15.4)
HCT VFR BLD AUTO: 49 % (ref 37.5–51)
HGB BLD-MCNC: 17.1 G/DL (ref 13–17.7)
MCH RBC QN AUTO: 32.9 PG (ref 26.6–33)
MCHC RBC AUTO-ENTMCNC: 34.9 G/DL (ref 31.5–35.7)
MCV RBC AUTO: 94.4 FL (ref 79–97)
PLATELET # BLD AUTO: 193 10*3/MM3 (ref 140–450)
PMV BLD AUTO: 10.7 FL (ref 6–12)
RBC # BLD AUTO: 5.19 10*6/MM3 (ref 4.14–5.8)
WBC NRBC COR # BLD: 5.97 10*3/MM3 (ref 3.4–10.8)

## 2023-10-23 PROCEDURE — G0103 PSA SCREENING: HCPCS | Performed by: INTERNAL MEDICINE

## 2023-10-23 PROCEDURE — 83036 HEMOGLOBIN GLYCOSYLATED A1C: CPT | Performed by: INTERNAL MEDICINE

## 2023-10-23 PROCEDURE — 80053 COMPREHEN METABOLIC PANEL: CPT | Performed by: INTERNAL MEDICINE

## 2023-10-23 PROCEDURE — 84443 ASSAY THYROID STIM HORMONE: CPT | Performed by: INTERNAL MEDICINE

## 2023-10-23 PROCEDURE — 85027 COMPLETE CBC AUTOMATED: CPT | Performed by: INTERNAL MEDICINE

## 2023-10-23 PROCEDURE — 36415 COLL VENOUS BLD VENIPUNCTURE: CPT | Performed by: INTERNAL MEDICINE

## 2023-10-23 PROCEDURE — 80061 LIPID PANEL: CPT | Performed by: INTERNAL MEDICINE

## 2023-10-23 PROCEDURE — 82607 VITAMIN B-12: CPT | Performed by: INTERNAL MEDICINE

## 2023-10-24 ENCOUNTER — OFFICE VISIT (OUTPATIENT)
Dept: INTERNAL MEDICINE | Facility: CLINIC | Age: 63
End: 2023-10-24
Payer: COMMERCIAL

## 2023-10-24 VITALS
SYSTOLIC BLOOD PRESSURE: 126 MMHG | WEIGHT: 245 LBS | HEIGHT: 72 IN | BODY MASS INDEX: 33.18 KG/M2 | OXYGEN SATURATION: 98 % | HEART RATE: 80 BPM | DIASTOLIC BLOOD PRESSURE: 74 MMHG

## 2023-10-24 DIAGNOSIS — R97.20 ELEVATED PSA: ICD-10-CM

## 2023-10-24 DIAGNOSIS — E78.2 MIXED HYPERLIPIDEMIA: Primary | ICD-10-CM

## 2023-10-24 DIAGNOSIS — Z00.00 ROUTINE GENERAL MEDICAL EXAMINATION AT A HEALTH CARE FACILITY: ICD-10-CM

## 2023-10-24 LAB
ALBUMIN SERPL-MCNC: 4.6 G/DL (ref 3.5–5.2)
ALBUMIN/GLOB SERPL: 2 G/DL
ALP SERPL-CCNC: 83 U/L (ref 39–117)
ALT SERPL W P-5'-P-CCNC: 36 U/L (ref 1–41)
ANION GAP SERPL CALCULATED.3IONS-SCNC: 12.6 MMOL/L (ref 5–15)
AST SERPL-CCNC: 27 U/L (ref 1–40)
BILIRUB SERPL-MCNC: 0.7 MG/DL (ref 0–1.2)
BUN SERPL-MCNC: 12 MG/DL (ref 8–23)
BUN/CREAT SERPL: 12.5 (ref 7–25)
CALCIUM SPEC-SCNC: 9.4 MG/DL (ref 8.6–10.5)
CHLORIDE SERPL-SCNC: 101 MMOL/L (ref 98–107)
CHOLEST SERPL-MCNC: 178 MG/DL (ref 0–200)
CO2 SERPL-SCNC: 26.4 MMOL/L (ref 22–29)
CREAT SERPL-MCNC: 0.96 MG/DL (ref 0.76–1.27)
EGFRCR SERPLBLD CKD-EPI 2021: 89.9 ML/MIN/1.73
GLOBULIN UR ELPH-MCNC: 2.3 GM/DL
GLUCOSE SERPL-MCNC: 97 MG/DL (ref 65–99)
HBA1C MFR BLD: 5.8 % (ref 4.8–5.6)
HDLC SERPL-MCNC: 36 MG/DL (ref 40–60)
LDLC SERPL CALC-MCNC: 92 MG/DL (ref 0–100)
LDLC/HDLC SERPL: 2.28 {RATIO}
POTASSIUM SERPL-SCNC: 4.3 MMOL/L (ref 3.5–5.2)
PROT SERPL-MCNC: 6.9 G/DL (ref 6–8.5)
PSA SERPL-MCNC: 4.34 NG/ML (ref 0–4)
SODIUM SERPL-SCNC: 140 MMOL/L (ref 136–145)
TRIGL SERPL-MCNC: 299 MG/DL (ref 0–150)
TSH SERPL DL<=0.05 MIU/L-ACNC: 4.58 UIU/ML (ref 0.27–4.2)
VIT B12 BLD-MCNC: 414 PG/ML (ref 211–946)
VLDLC SERPL-MCNC: 50 MG/DL (ref 5–40)

## 2023-10-24 RX ORDER — SILDENAFIL 100 MG/1
100 TABLET, FILM COATED ORAL DAILY PRN
Qty: 30 TABLET | Refills: 5 | Status: SHIPPED | OUTPATIENT
Start: 2023-10-24

## 2023-10-24 NOTE — PROGRESS NOTES
Patient is a 63 y.o. male who is here for a physical.  Chief Complaint   Patient presents with    Annual Exam         HPI:    Here for CPE.      History:     Patient Active Problem List   Diagnosis    Hyperlipidemia    Male erectile disorder    FARIDEH on CPAP    Allergic rhinitis    Arthritis    Vitamin D deficiency    Routine general medical examination at a health care facility    Tenderness of lumbar region       Past Medical History:   Diagnosis Date    Acute sinusitis     Getting over sinus infection. Symptoms better with augmentin.    Elbow enthesopathy     Hyperlipoproteinemia type IV     Overweight     Plantar fasciitis     Vitamin D deficiency        Past Surgical History:   Procedure Laterality Date    LASIK      SINUS SURGERY  02/28/2023       Current Outpatient Medications on File Prior to Visit   Medication Sig    atorvastatin (LIPITOR) 20 MG tablet TAKE ONE (1) TABLET BY MOUTH NEVERY NIGHT    [DISCONTINUED] sildenafil (Viagra) 100 MG tablet Take 1 tablet by mouth Daily As Needed for Erectile Dysfunction.    [DISCONTINUED] azithromycin (Zithromax Z-Salty) 250 MG tablet Take 2 tablets the first day, then 1 tablet daily for 4 days.    [DISCONTINUED] methylPREDNISolone (MEDROL) 4 MG dose pack Take as directed on package instructions. (Patient not taking: Reported on 10/24/2023)     No current facility-administered medications on file prior to visit.       Family History   Problem Relation Age of Onset    Diabetes Other     Hypertension Other     Hyperlipidemia Other     Cancer Other     Stroke Other     Arthritis Other     Graves' disease Other     Diverticulosis Other         of intestine    Breast cancer Other     Cancer Sister         2x breast cancer       Social History     Socioeconomic History    Marital status:    Tobacco Use    Smoking status: Never    Smokeless tobacco: Never   Substance and Sexual Activity    Alcohol use: Yes     Comment: Social drinker    Drug use: Never    Sexual activity:  "Yes     Partners: Female         Review of Systems   Constitutional:  Negative for chills, fatigue, fever and unexpected weight change.   HENT:  Negative for ear pain, hearing loss, sore throat and trouble swallowing.    Eyes:  Negative for discharge and itching.   Respiratory:  Negative for cough, chest tightness and shortness of breath.    Cardiovascular:  Negative for chest pain, palpitations and leg swelling.   Gastrointestinal:  Negative for abdominal pain, blood in stool, constipation, diarrhea and vomiting.        12/13 colonoscopy normal  6/19 colonoscopy normal   Endocrine: Negative for polydipsia and polyuria.   Genitourinary:  Positive for difficulty urinating. Negative for dysuria, enuresis, frequency, hematuria and urgency.        10/23 psa   Musculoskeletal:  Positive for arthralgias and back pain. Negative for gait problem and joint swelling.   Skin:  Negative for rash and wound.   Allergic/Immunologic: Negative for immunocompromised state.   Neurological:  Negative for dizziness, syncope, weakness, light-headedness, numbness and headaches.   Hematological:  Does not bruise/bleed easily.   Psychiatric/Behavioral:  Negative for behavioral problems, dysphoric mood and sleep disturbance. The patient is not nervous/anxious.        /74   Pulse 80   Ht 182.9 cm (72.01\")   Wt 111 kg (245 lb)   SpO2 98%   BMI 33.22 kg/m²       Physical Exam  Constitutional:       Appearance: Normal appearance. He is well-developed.   HENT:      Head: Normocephalic and atraumatic.      Right Ear: External ear normal.      Left Ear: External ear normal.      Nose: Nose normal.      Mouth/Throat:      Mouth: Mucous membranes are moist.      Pharynx: Oropharynx is clear.   Eyes:      Extraocular Movements: Extraocular movements intact.      Conjunctiva/sclera: Conjunctivae normal.      Pupils: Pupils are equal, round, and reactive to light.   Cardiovascular:      Rate and Rhythm: Normal rate and regular rhythm.      " Pulses: Normal pulses.      Heart sounds: Normal heart sounds.   Pulmonary:      Effort: Pulmonary effort is normal.      Breath sounds: Normal breath sounds.   Abdominal:      General: Bowel sounds are normal.      Palpations: Abdomen is soft.   Genitourinary:     Comments: 2019 colonoscopy noted  Will refer to Urology sec to increased PSA  Musculoskeletal:         General: Normal range of motion.      Cervical back: Normal range of motion and neck supple.   Lymphadenopathy:      Cervical: No cervical adenopathy.   Skin:     General: Skin is warm and dry.   Neurological:      General: No focal deficit present.      Mental Status: He is alert and oriented to person, place, and time.   Psychiatric:         Mood and Affect: Mood normal.         Behavior: Behavior normal.         Thought Content: Thought content normal.         Procedure:      Discussion/Summary:        HME-counseled on diet and exercise, fasting labs dw patient  hyperlipidemia-labs improved, counseled on diet  tara-cont cpap, stable  rhinitis-advised compliance with flonase and prn allegra, stable  ED-sildenafil prn  Vit d def-recheck on target, cont replacement  Elevated psa-will refer to Urology     10/24 labs noted and dw patient     Reviewed the following with the patient: advised patient to avoid alcoholic beverages, encouraged patient to exercise 5-7 days per week for 30 minutes at a time, ideal body weight discussed with patient and weight loss encouraged.    Current Outpatient Medications:     atorvastatin (LIPITOR) 20 MG tablet, TAKE ONE (1) TABLET BY MOUTH NEVERY NIGHT, Disp: 90 tablet, Rfl: 1    sildenafil (Viagra) 100 MG tablet, Take 1 tablet by mouth Daily As Needed for Erectile Dysfunction., Disp: 30 tablet, Rfl: 5        Diagnoses and all orders for this visit:    1. Mixed hyperlipidemia (Primary)    2. Routine general medical examination at a health care facility    3. Elevated PSA  -     Ambulatory Referral to Urology    Other  orders  -     sildenafil (Viagra) 100 MG tablet; Take 1 tablet by mouth Daily As Needed for Erectile Dysfunction.  Dispense: 30 tablet; Refill: 5

## 2023-10-26 DIAGNOSIS — G47.33 OSA ON CPAP: Primary | ICD-10-CM

## 2023-10-27 ENCOUNTER — TELEPHONE (OUTPATIENT)
Dept: SLEEP MEDICINE | Facility: HOSPITAL | Age: 63
End: 2023-10-27

## 2023-10-27 NOTE — TELEPHONE ENCOUNTER
Caller: Glen Sousa    Relationship to patient: Self    Best call back number: [phone] (home)       Patient is needing: IF POSSIBLE TO WORK PT IN FOR SLEEP STUDY BEFORE END OF THE YEAR HE WOULD LIKE TO HAVE IT DONE, ALL HIS INSURANCE DEDUCTIBLES HAVE BEEN MET FOR THE YEAR. PLEASE CALL PT TO ADVISE.

## 2023-10-31 NOTE — PROGRESS NOTES
Chief Complaint  Sleeping Problem    Subjective     History of Present Illness:  Glen Sousa is a 63 y.o. male with a history of arthritis, hyperlipidemia, and vitamin D deficiency.  Patient is referred by Dr. Ibrahim primary care.  He reports a history of FARIDEH on PAP therapy.  Patient is in need of new device.  Review of self-reported questionnaire notes symptoms including snoring, frequent awakening, and nasal allergies.  Patient reports symptoms have been ongoing for more than 5 years.  He typically goes to bed 11 PM waking at 7 AM on weekdays and 8 AM on weekends.  Patient averages 7 hours of sleep per night and it takes approximately 30 minutes for him to get to sleep.  Patient drinks alcohol 2-3 times per week and denies use of illicit or recreational drugs.  He drinks regular tea. He has to use his machine and can't sleep without it. It is over 10 years old    Further details are as follows:    [place] Scale is (out of 24): Total score: 5     Estimated average amount of sleep per night: 7-8 hours  Number of times he wakes up at night: 2-3 times  Difficulty falling back asleep: not usually  It usually takes 30 minutes to go to sleep.  He feels sleepy upon waking up: usually  Rotating or night shift work: no    Drowsiness/Sleepiness:  He exhibits the following:  Rarely naps    Snoring/Breathing:  He exhibits the following: (without use of machine)  loud snoring and quits breathing at night    Head Injury:  He exhibits the following:  No    Reflux:  He describes the following:  Not usually    Narcolepsy:  He exhibits the following:  none    RLS/PLMs:  He describes the following:  none    Insomnia:  He describes the following:  frequent awakenings    Parasomnia:  He exhibits the following:  none    Weight:       11/02/23  1131   Weight: 111 kg (245 lb)      Weight change in the last year:  gain: 0 lbs    The patient's relevant past medical, surgical, family, and social history reviewed and updated in Epic as  "appropriate.    Review of Systems   Constitutional: Negative.    HENT: Negative.     Eyes: Negative.    Respiratory: Negative.     Cardiovascular: Negative.    Gastrointestinal: Negative.    Endocrine: Negative.    Genitourinary: Negative.    Musculoskeletal: Negative.    Skin: Negative.    Allergic/Immunologic: Negative.    Neurological: Negative.    Hematological: Negative.    Psychiatric/Behavioral: Negative.     All other systems reviewed and are negative.      PMH:    Past Medical History:   Diagnosis Date    Acute sinusitis     Getting over sinus infection. Symptoms better with augmentin.    Elbow enthesopathy     Hyperlipoproteinemia type IV     Overweight     Plantar fasciitis     Vitamin D deficiency      Past Surgical History:   Procedure Laterality Date    LASIK      SINUS SURGERY  02/28/2023       Allergies   Allergen Reactions    Niacin     Niacin And Related        MEDS:  Prior to Admission medications    Medication Sig Start Date End Date Taking? Authorizing Provider   atorvastatin (LIPITOR) 20 MG tablet TAKE ONE (1) TABLET BY MOUTH NEVERY NIGHT 4/17/23   Ender Ibrahim MD   sildenafil (Viagra) 100 MG tablet Take 1 tablet by mouth Daily As Needed for Erectile Dysfunction. 10/24/23   Ender Ibrahim MD       FH:  Family History   Problem Relation Age of Onset    Diabetes Mother     Cancer Sister         2x breast cancer    Diabetes Other     Hypertension Other     Hyperlipidemia Other     Cancer Other     Stroke Other     Arthritis Other     Graves' disease Other     Diverticulosis Other         of intestine    Breast cancer Other        Objective   Vital Signs:  /81 (BP Location: Left arm, Patient Position: Sitting, Cuff Size: Large Adult)   Pulse 78   Ht 182.9 cm (72\")   Wt 111 kg (245 lb)   SpO2 97%   BMI 33.23 kg/m²     BMI is >= 30 and <35. (Class 1 Obesity). The following options were offered after discussion;: going to medical weight loss practicie.        Physical Exam  Vitals " reviewed.   Constitutional:       Appearance: Normal appearance.   HENT:      Head: Normocephalic and atraumatic.      Nose: Nose normal.      Mouth/Throat:      Mouth: Mucous membranes are moist.   Cardiovascular:      Rate and Rhythm: Normal rate and regular rhythm.      Heart sounds: No murmur heard.     No friction rub. No gallop.   Pulmonary:      Effort: Pulmonary effort is normal. No respiratory distress.      Breath sounds: Normal breath sounds. No wheezing or rhonchi.   Neurological:      Mental Status: He is alert and oriented to person, place, and time.   Psychiatric:         Behavior: Behavior normal.             Result Review :                Assessment and Plan  Glen Sousa is a 63 y.o. male with a past medical history of arthritis, hyperlipidemia, and vitamin D deficiency who presents to establish care for FARIDEH on PAP therapy.  The patient is in need of a new device as his is well over 10 years old.  We are unable to obtain a download from his machine.  He does note he cannot sleep without his device and wears it every night.  We will need to obtain a home sleep test to reestablish diagnosis of sleep apnea.  After which, I will place orders for new device and supplies and have noted that he will need to return for follow-up in 31-90 days for compliance check.  I have noted the patient will need to use the device more than 4 hours a night, more than 70% of the time in order to remain fully compliant.  We discussed alternatives including the inspire device.  Patient is going to medical weight loss to work on his weight.      Diagnoses and all orders for this visit:    1. Obstructive sleep apnea, adult (Primary)  -     Home Sleep Study; Future    2. Obesity (BMI 30.0-34.9)                 I discussed the consequences of uncontrolled sleep apnea including hypertension, heart disease, diabetes, stroke, and dementia. I further discussed sleep apnea therapeutic options including CPAP, Weight loss, Oral  dental appliance, and surgery.         Follow Up  Return for Follow up after study.  Patient was given instructions and counseling regarding his condition or for health maintenance advice. Please see specific information pulled into the AVS if appropriate.     SHAWNA Rodrigez, ACNP-BC  Pulmonology, Critical Care, and Sleep Medicine

## 2023-11-02 ENCOUNTER — OFFICE VISIT (OUTPATIENT)
Dept: SLEEP MEDICINE | Facility: HOSPITAL | Age: 63
End: 2023-11-02
Payer: COMMERCIAL

## 2023-11-02 VITALS
BODY MASS INDEX: 33.18 KG/M2 | WEIGHT: 245 LBS | DIASTOLIC BLOOD PRESSURE: 81 MMHG | HEIGHT: 72 IN | OXYGEN SATURATION: 97 % | SYSTOLIC BLOOD PRESSURE: 148 MMHG | HEART RATE: 78 BPM

## 2023-11-02 DIAGNOSIS — G47.33 OBSTRUCTIVE SLEEP APNEA, ADULT: Primary | ICD-10-CM

## 2023-11-02 DIAGNOSIS — E66.9 OBESITY (BMI 30.0-34.9): ICD-10-CM

## 2023-11-06 RX ORDER — ATORVASTATIN CALCIUM 20 MG/1
TABLET, FILM COATED ORAL
Qty: 90 TABLET | Refills: 1 | Status: SHIPPED | OUTPATIENT
Start: 2023-11-06

## 2023-11-15 ENCOUNTER — OFFICE VISIT (OUTPATIENT)
Dept: CARDIOLOGY | Facility: CLINIC | Age: 63
End: 2023-11-15
Payer: COMMERCIAL

## 2023-11-15 VITALS
WEIGHT: 241 LBS | OXYGEN SATURATION: 94 % | HEIGHT: 72 IN | DIASTOLIC BLOOD PRESSURE: 82 MMHG | BODY MASS INDEX: 32.64 KG/M2 | SYSTOLIC BLOOD PRESSURE: 136 MMHG | HEART RATE: 61 BPM

## 2023-11-15 DIAGNOSIS — R06.02 SHORTNESS OF BREATH: ICD-10-CM

## 2023-11-15 DIAGNOSIS — E78.2 MIXED HYPERLIPIDEMIA: Primary | ICD-10-CM

## 2023-11-15 PROCEDURE — 93000 ELECTROCARDIOGRAM COMPLETE: CPT | Performed by: INTERNAL MEDICINE

## 2023-11-15 PROCEDURE — 99204 OFFICE O/P NEW MOD 45 MIN: CPT | Performed by: INTERNAL MEDICINE

## 2023-11-15 RX ORDER — METOPROLOL TARTRATE 50 MG/1
50 TABLET, FILM COATED ORAL ONCE
Qty: 1 TABLET | Refills: 0 | Status: SHIPPED | OUTPATIENT
Start: 2023-11-15 | End: 2023-11-15

## 2023-11-15 NOTE — PROGRESS NOTES
[facility] Cardiology  Consultation H&P  Glen Sousa  1960  [phone]  There is no work phone number on file..    VISIT DATE:  11/15/2023    PCP: Ender Ibrahim MD  [address]  [facility] [address]    CC:  Chief Complaint   Patient presents with    Consult         ASSESSMENT:   Diagnosis Plan   1. Mixed hyperlipidemia  CT Cardiac Calcium Score Without Dye      2. Shortness of breath  ECG 12 Lead    Treadmill Stress Test            PLAN:  Mixed hyperlipidemia: LDL goal less than 100.  Continue atorvastatin 20 mg p.o. daily.  Underlying mildly depressed HDL and moderately elevated triglycerides.  Recommending trial of dietary and lifestyle modification to achieve weight loss before titrating medical therapy with addition of fenofibrate.    Metabolic syndrome: Discussed dietary and lifestyle modifications to achieve weight loss.  He is very motivated.    Mild, subclinical hypothyroidism: Trending level with dietary and lifestyle modifications.  If persistent over the next 6 months, will consider low-dose supplementation.    Prediabetes    Exercise treadmill test pending for cardiac risk stratification and coronary ischemia evaluation.  Coronary calcium score pending for cardiac risk stratification.    History of Present Illness   63-year-old gentleman with history of hyperlipidemia, developing underlying metabolic syndrome with prediabetes, low HDL, hypertriglyceridemia, and central obesity.  Denies chest discomfort or palpitations.  Blood pressures typically run less than 135/80 mmHg.  He is compliant with medical therapy.  Does notice some shortness of breath and class II pattern with such activities as going up more than a flight of stairs.  No family history of early CAD or stroke.  Has begun dietary and lifestyle modifications to achieve weight loss.    PHYSICAL EXAMINATION:  Vitals:    11/15/23 0847   BP: 136/82   BP Location: Left arm   Patient Position:  "Sitting   Cuff Size: Adult   Pulse: 61   SpO2: 94%   Weight: 109 kg (241 lb)   Height: 182.9 cm (72\")     General Appearance:    Alert, cooperative, no distress, appears stated age   Head:    Normocephalic, without obvious abnormality, atraumatic   Eyes:    conjunctiva/corneas clear, EOM's intact, fundi     benign, both eyes   Ears:    Normal TM's and external ear canals, both ears   Nose:   Nares normal, septum midline, mucosa normal, no drainage    or sinus tenderness   Throat:   Lips, mucosa, and tongue normal; teeth and gums normal   Neck:   Supple, symmetrical, trachea midline, no adenopathy;     thyroid:  no enlargement/tenderness/nodules; no carotid    bruit or JVD   Back:     Symmetric, no curvature, ROM normal, no CVA tenderness   Lungs:     Clear to auscultation bilaterally, respirations unlabored   Chest Wall:    No tenderness or deformity    Heart:    Regular rate and rhythm, S1 and S2 normal, no murmur, rub   or gallop, normal carotid impulse bilaterally without bruit.   Abdomen:     Soft, non-tender, bowel sounds active all four quadrants,     no masses, no organomegaly   Extremities:   Extremities normal, atraumatic, no cyanosis or edema   Pulses:   2+ and symmetric all extremities   Skin:   Skin color, texture, turgor normal, no rashes or lesions   Lymph nodes:   Cervical, supraclavicular, and axillary nodes normal   Neurologic:   normal strength, sensation intact     throughout       Diagnostic Data:    ECG 12 Lead    Date/Time: 11/15/2023 8:54 AM  Performed by: Cosmo Mas III, MD    Authorized by: Cosmo Mas III, MD  Previous ECG: no previous ECG available  Rhythm: sinus rhythm    Clinical impression: normal ECG        Lab Results   Component Value Date    CHLPL 233 (H) 01/19/2016    TRIG 299 (H) 10/23/2023    HDL 36 (L) 10/23/2023     Lab Results   Component Value Date    GLUCOSE 97 10/23/2023    BUN 12 10/23/2023    CREATININE 0.96 10/23/2023     10/23/2023    K 4.3 10/23/2023    CL " 101 10/23/2023    CO2 26.4 10/23/2023     Lab Results   Component Value Date    HGBA1C 5.80 (H) 10/23/2023     Lab Results   Component Value Date    WBC 5.97 10/23/2023    HGB 17.1 10/23/2023    HCT 49.0 10/23/2023     10/23/2023       PROBLEM LIST:  Patient Active Problem List   Diagnosis    Hyperlipidemia    Male erectile disorder    FARIDEH on CPAP    Allergic rhinitis    Arthritis    Vitamin D deficiency    Routine general medical examination at a health care facility    Tenderness of lumbar region       PAST MEDICAL HX  Past Medical History:   Diagnosis Date    Acute sinusitis     Getting over sinus infection. Symptoms better with augmentin.    Elbow enthesopathy     Hyperlipoproteinemia type IV     Overweight     Plantar fasciitis     Vitamin D deficiency        Allergies  Allergies   Allergen Reactions    Niacin     Niacin And Related        Current Medications    Current Outpatient Medications:     atorvastatin (LIPITOR) 20 MG tablet, TAKE ONE (1) TABLET BY MOUTH NEVERY NIGHT, Disp: 90 tablet, Rfl: 1    sildenafil (Viagra) 100 MG tablet, Take 1 tablet by mouth Daily As Needed for Erectile Dysfunction., Disp: 30 tablet, Rfl: 5    metoprolol tartrate (LOPRESSOR) 50 MG tablet, Take 1 tablet by mouth 1 (One) Time for 1 dose. 3 hours prior to CT scan, Disp: 1 tablet, Rfl: 0         ROS  ROS      SOCIAL HX  Social History     Socioeconomic History    Marital status:    Tobacco Use    Smoking status: Never    Smokeless tobacco: Never   Vaping Use    Vaping Use: Never used   Substance and Sexual Activity    Alcohol use: Yes     Comment: Social drinker    Drug use: Never    Sexual activity: Yes     Partners: Female       FAMILY HX  Family History   Problem Relation Age of Onset    Diabetes Mother     Cancer Sister         2x breast cancer    Diabetes Other     Hypertension Other     Hyperlipidemia Other     Cancer Other     Stroke Other     Arthritis Other     Graves' disease Other     Diverticulosis Other          of intestine    Breast cancer Other              Cosmo Mas III, MD, FACC

## 2023-11-16 RX ORDER — SILDENAFIL CITRATE 20 MG/1
20 TABLET ORAL 3 TIMES DAILY
Qty: 90 TABLET | Refills: 2 | Status: SHIPPED | OUTPATIENT
Start: 2023-11-16

## 2023-11-16 RX ORDER — SILDENAFIL 100 MG/1
100 TABLET, FILM COATED ORAL DAILY PRN
Qty: 30 TABLET | Refills: 5 | Status: SHIPPED | OUTPATIENT
Start: 2023-11-16 | End: 2023-11-16 | Stop reason: DRUGHIGH

## 2023-11-16 NOTE — TELEPHONE ENCOUNTER
Caller: Glen Sousa    Relationship: Self    Best call back number: [phone]     Requested Prescriptions:   Requested Prescriptions     Pending Prescriptions Disp Refills    sildenafil (Viagra) 100 MG tablet 30 tablet 5     Sig: Take 1 tablet by mouth Daily As Needed for Erectile Dysfunction.        Pharmacy where request should be sent: [address] - [phone] [facility] [phone] FX     Last office visit with prescribing clinician: 10/24/2023   Last telemedicine visit with prescribing clinician: Visit date not found   Next office visit with prescribing clinician: Visit date not found     Additional details provided by patient: PATIENT STATES HE IS ONLY ON 20MG TABLETS, NOT 100MG. PLEASE CORRECT THIS AND SEND TO PHARMACY.    Does the patient have less than a 3 day supply:  [x] Yes  [] No    Would you like a call back once the refill request has been completed: [] Yes [x] No    If the office needs to give you a call back, can they leave a voicemail: [] Yes [x] No    Cesar Presley Rep   11/16/23 11:40 EST

## 2023-11-17 ENCOUNTER — HOSPITAL ENCOUNTER (OUTPATIENT)
Dept: SLEEP MEDICINE | Facility: HOSPITAL | Age: 63
Discharge: HOME | End: 2023-11-17
Admitting: NURSE PRACTITIONER
Payer: COMMERCIAL

## 2023-11-17 VITALS — HEIGHT: 72 IN | WEIGHT: 245 LBS | BODY MASS INDEX: 33.18 KG/M2

## 2023-11-17 DIAGNOSIS — G47.33 OBSTRUCTIVE SLEEP APNEA, ADULT: ICD-10-CM

## 2023-11-17 PROCEDURE — 95800 SLP STDY UNATTENDED: CPT

## 2023-11-20 DIAGNOSIS — G47.33 OBSTRUCTIVE SLEEP APNEA, ADULT: Primary | ICD-10-CM

## 2023-11-20 DIAGNOSIS — R06.83 SNORING: ICD-10-CM

## 2023-11-21 ENCOUNTER — TELEPHONE (OUTPATIENT)
Dept: SLEEP MEDICINE | Facility: HOSPITAL | Age: 63
End: 2023-11-21
Payer: COMMERCIAL

## 2023-11-28 ENCOUNTER — TELEPHONE (OUTPATIENT)
Dept: CARDIOLOGY | Facility: CLINIC | Age: 63
End: 2023-11-28

## 2023-11-28 ENCOUNTER — HOSPITAL ENCOUNTER (OUTPATIENT)
Dept: CARDIOLOGY | Facility: HOSPITAL | Age: 63
Discharge: HOME | End: 2023-11-28
Admitting: INTERNAL MEDICINE
Payer: COMMERCIAL

## 2023-11-28 LAB
BH CV STRESS BP STAGE 1: NORMAL
BH CV STRESS BP STAGE 2: NORMAL
BH CV STRESS BP STAGE 3: NORMAL
BH CV STRESS DURATION MIN STAGE 1: 3
BH CV STRESS DURATION MIN STAGE 2: 2
BH CV STRESS DURATION MIN STAGE 3: 3
BH CV STRESS DURATION SEC STAGE 1: 0
BH CV STRESS DURATION SEC STAGE 2: 30
BH CV STRESS DURATION SEC STAGE 3: 11
BH CV STRESS GRADE STAGE 1: 10
BH CV STRESS GRADE STAGE 2: 12
BH CV STRESS GRADE STAGE 3: 14
BH CV STRESS HR STAGE 1: 115
BH CV STRESS HR STAGE 2: 136
BH CV STRESS HR STAGE 3: 162
BH CV STRESS METS STAGE 1: 5
BH CV STRESS METS STAGE 2: 7.5
BH CV STRESS METS STAGE 3: 10
BH CV STRESS O2 STAGE 1: 98
BH CV STRESS O2 STAGE 2: 98
BH CV STRESS O2 STAGE 3: 97
BH CV STRESS PROTOCOL 1: NORMAL
BH CV STRESS RECOVERY BP: NORMAL MMHG
BH CV STRESS RECOVERY HR: 91 BPM
BH CV STRESS RECOVERY O2: 99 %
BH CV STRESS SPEED STAGE 1: 1.7
BH CV STRESS SPEED STAGE 2: 2.5
BH CV STRESS SPEED STAGE 3: 3.4
BH CV STRESS STAGE 1: 1
BH CV STRESS STAGE 2: 2
BH CV STRESS STAGE 3: 3
MAXIMAL PREDICTED HEART RATE: 159 BPM
PERCENT MAX PREDICTED HR: 101.89 %
STRESS BASELINE BP: NORMAL MMHG
STRESS BASELINE HR: 78 BPM
STRESS O2 SAT REST: 98 %
STRESS PERCENT HR: 120 %
STRESS POST ESTIMATED WORKLOAD: 10.1 METS
STRESS POST EXERCISE DUR MIN: 8 MIN
STRESS POST EXERCISE DUR SEC: 41 SEC
STRESS POST O2 SAT PEAK: 97 %
STRESS POST PEAK BP: NORMAL MMHG
STRESS POST PEAK HR: 162 BPM
STRESS TARGET HR: 135 BPM

## 2023-11-28 PROCEDURE — 93018 CV STRESS TEST I&R ONLY: CPT | Performed by: INTERNAL MEDICINE

## 2023-11-28 PROCEDURE — 93017 CV STRESS TEST TRACING ONLY: CPT

## 2023-11-28 NOTE — TELEPHONE ENCOUNTER
----- Message from Cosmo Mas III, MD sent at 11/28/2023  9:07 AM EST -----  Normal treadmill test.

## 2023-12-07 ENCOUNTER — TELEPHONE (OUTPATIENT)
Dept: CARDIOLOGY | Facility: CLINIC | Age: 63
End: 2023-12-07
Payer: COMMERCIAL

## 2023-12-07 NOTE — TELEPHONE ENCOUNTER
----- Message from Cosmo Mas III, MD sent at 12/7/2023  9:12 AM EST -----  Reassuring coronary calcium score.

## 2023-12-22 ENCOUNTER — OFFICE VISIT (OUTPATIENT)
Dept: INTERNAL MEDICINE | Facility: CLINIC | Age: 63
End: 2023-12-22
Payer: COMMERCIAL

## 2023-12-22 VITALS
HEART RATE: 79 BPM | DIASTOLIC BLOOD PRESSURE: 78 MMHG | WEIGHT: 227 LBS | SYSTOLIC BLOOD PRESSURE: 124 MMHG | OXYGEN SATURATION: 98 % | BODY MASS INDEX: 30.75 KG/M2 | TEMPERATURE: 98.2 F | HEIGHT: 72 IN

## 2023-12-22 DIAGNOSIS — R49.0 HOARSENESS: ICD-10-CM

## 2023-12-22 DIAGNOSIS — R09.81 SINUS CONGESTION: Primary | ICD-10-CM

## 2023-12-22 DIAGNOSIS — R51.9 FREQUENT HEADACHES: ICD-10-CM

## 2023-12-22 PROCEDURE — 99214 OFFICE O/P EST MOD 30 MIN: CPT | Performed by: NURSE PRACTITIONER

## 2023-12-22 RX ORDER — PREDNISONE 5 MG/1
TABLET ORAL
Qty: 21 TABLET | Refills: 0 | Status: SHIPPED | OUTPATIENT
Start: 2023-12-22

## 2023-12-22 RX ORDER — AZITHROMYCIN 250 MG/1
TABLET, FILM COATED ORAL
Qty: 6 TABLET | Refills: 0 | Status: SHIPPED | OUTPATIENT
Start: 2023-12-22

## 2023-12-22 NOTE — PROGRESS NOTES
"Chief Complaint   Patient presents with    Sore Throat     Low grade fever, congestion, headache. Symptoms started        HPI  Glen Sousa is a 63 y.o. male presents for sinus pressure, fever, aches, and headache.  This started about Monday.  Tuesday he was seen at [facility] and tested for COVID/Flu and all was negative.  He took another COVID test this am at home which was negative.    The following portions of the patient's history were reviewed and updated as appropriate: allergies, current medications, past family history, past medical history, past social history, past surgical history, and problem list.    Subjective  Review of Systems   Constitutional:  Positive for chills and fever. Negative for activity change, appetite change and fatigue.   HENT:  Positive for congestion, sinus pressure and voice change.    Respiratory:  Negative for cough and shortness of breath.    Cardiovascular:  Negative for chest pain and leg swelling.   Gastrointestinal:  Negative for abdominal pain.   Musculoskeletal:  Positive for myalgias.   Neurological:  Positive for headache. Negative for dizziness, weakness and confusion.   Psychiatric/Behavioral:  Negative for behavioral problems and decreased concentration.        Objective  Visit Vitals  /78 (BP Location: Left arm, Patient Position: Sitting)   Pulse 79   Temp 98.2 °F (36.8 °C) (Temporal)   Ht 182.9 cm (72\")   Wt 103 kg (227 lb)   SpO2 98%   BMI 30.79 kg/m²        Physical Exam  Vitals and nursing note reviewed.   Constitutional:       Appearance: Normal appearance.      Comments: Hoarse voice   HENT:      Head: Normocephalic and atraumatic.      Ears:      Comments: Both TMs dull     Nose: Mucosal edema present.      Right Sinus: Maxillary sinus tenderness present.      Left Sinus: Maxillary sinus tenderness present.      Mouth/Throat:      Mouth: Mucous membranes are moist.      Comments: +PND  Eyes:      Pupils: Pupils are equal, round, and reactive to " light.   Cardiovascular:      Rate and Rhythm: Normal rate and regular rhythm.   Pulmonary:      Effort: Pulmonary effort is normal.      Breath sounds: Normal breath sounds.   Skin:     General: Skin is warm and dry.      Capillary Refill: Capillary refill takes less than 2 seconds.   Neurological:      Mental Status: He is alert and oriented to person, place, and time. Mental status is at baseline.   Psychiatric:         Mood and Affect: Mood normal.          Procedures     Assessment and Plan  Diagnoses and all orders for this visit:    1. Sinus congestion (Primary)  -     predniSONE 5 MG (21) tablet therapy pack dose pack; Take as directed on package instructions.  Dispense: 21 tablet; Refill: 0  -     azithromycin (Zithromax Z-Salty) 250 MG tablet; Take 2 tablets by mouth on day 1, then 1 tablet daily on days 2-5  Dispense: 6 tablet; Refill: 0    2. Hoarseness  -     predniSONE 5 MG (21) tablet therapy pack dose pack; Take as directed on package instructions.  Dispense: 21 tablet; Refill: 0    3. Frequent headaches    Has already been tested for Flu/Covid  Start steroid salty  Zpak given and instructed to start only if he doesn't feel better in 2-3 days due to the upcoming holiday  Instructed rest and fluids  Tylenol prn aches/headaches    Return if symptoms worsen or fail to improve.        SHAWNA Pollock

## 2024-01-18 ENCOUNTER — OFFICE VISIT (OUTPATIENT)
Dept: INTERNAL MEDICINE | Facility: CLINIC | Age: 64
End: 2024-01-18
Payer: COMMERCIAL

## 2024-01-18 VITALS
TEMPERATURE: 97 F | SYSTOLIC BLOOD PRESSURE: 140 MMHG | HEART RATE: 76 BPM | HEIGHT: 72 IN | RESPIRATION RATE: 20 BRPM | OXYGEN SATURATION: 97 % | DIASTOLIC BLOOD PRESSURE: 82 MMHG | BODY MASS INDEX: 30.75 KG/M2 | WEIGHT: 227 LBS

## 2024-01-18 DIAGNOSIS — R42 DIZZINESS: Primary | ICD-10-CM

## 2024-01-18 DIAGNOSIS — J06.9 ACUTE URI: ICD-10-CM

## 2024-01-18 RX ORDER — METHYLPREDNISOLONE 4 MG/1
TABLET ORAL
Qty: 21 TABLET | Refills: 0 | Status: SHIPPED | OUTPATIENT
Start: 2024-01-18

## 2024-01-18 RX ORDER — MECLIZINE HYDROCHLORIDE 25 MG/1
25 TABLET ORAL 3 TIMES DAILY PRN
Status: CANCELLED | OUTPATIENT
Start: 2024-01-18

## 2024-01-18 RX ORDER — BENZONATATE 100 MG/1
100 CAPSULE ORAL 3 TIMES DAILY PRN
Qty: 30 CAPSULE | Refills: 1 | Status: SHIPPED | OUTPATIENT
Start: 2024-01-18

## 2024-01-18 RX ORDER — MECLIZINE HYDROCHLORIDE 25 MG/1
TABLET ORAL
Qty: 60 TABLET | Refills: 1 | Status: SHIPPED | OUTPATIENT
Start: 2024-01-18

## 2024-01-18 RX ORDER — PSEUDOEPHEDRINE HCL 120 MG/1
120 TABLET, FILM COATED, EXTENDED RELEASE ORAL EVERY 12 HOURS
Qty: 20 TABLET | Refills: 0 | Status: SHIPPED | OUTPATIENT
Start: 2024-01-18

## 2024-01-18 RX ORDER — GUAIFENESIN 600 MG/1
1200 TABLET, EXTENDED RELEASE ORAL 2 TIMES DAILY
Qty: 30 TABLET | Refills: 0 | Status: SHIPPED | OUTPATIENT
Start: 2024-01-18

## 2024-01-18 NOTE — PROGRESS NOTES
Follow Up Office Visit      Date: 2024   Patient Name: Glen Sousa  : 1960   MRN: [id number]     Chief Complaint:    Chief Complaint   Patient presents with    Cough     Had positive test last week- now has sinus pressure     Earache       History of Present Illness: Glen Sousa is a 63 y.o. male who is here today to follow up with cough and fullness in ears. + covid , has had 2 negative covid retests since then. Complaining of dry, nonproductive cough for >30 days and L ear discomfort. He also admits to some vertigo and sinus pain. He has tried mucinex, OTC cough syrups, and OTC ear drops without relief.       Subjective      Past Medical History:   Diagnosis Date    Acute sinusitis     Getting over sinus infection. Symptoms better with augmentin.    Elbow enthesopathy     Hyperlipoproteinemia type IV     Overweight     Plantar fasciitis     Vitamin D deficiency        Past Surgical History:   Procedure Laterality Date    LASIK      SINUS SURGERY  2023       Family History   Problem Relation Age of Onset    Diabetes Mother     Cancer Sister         2x breast cancer    Diabetes Other     Hypertension Other     Hyperlipidemia Other     Cancer Other     Stroke Other     Arthritis Other     Graves' disease Other     Diverticulosis Other         of intestine    Breast cancer Other         Social History     Socioeconomic History    Marital status:    Tobacco Use    Smoking status: Never    Smokeless tobacco: Never   Vaping Use    Vaping Use: Never used   Substance and Sexual Activity    Alcohol use: Yes     Comment: Social drinker    Drug use: Never    Sexual activity: Yes     Partners: Female         I have reviewed the patients family history, social history, past medical history, past surgical history and have updated it as appropriate.     Medications:     Current Outpatient Medications:     atorvastatin (LIPITOR) 20 MG tablet, TAKE ONE (1) TABLET BY MOUTH NEVERY  "NIGHT, Disp: 90 tablet, Rfl: 1    sildenafil (REVATIO) 20 MG tablet, Take 1 tablet by mouth 3 (Three) Times a Day. Take 2-4 as needed, Disp: 90 tablet, Rfl: 2    benzonatate (Tessalon Perles) 100 MG capsule, Take 1 capsule by mouth 3 (Three) Times a Day As Needed for Cough., Disp: 30 capsule, Rfl: 1    guaiFENesin (Mucinex) 600 MG 12 hr tablet, Take 2 tablets by mouth 2 (Two) Times a Day., Disp: 30 tablet, Rfl: 0    meclizine (ANTIVERT) 25 MG tablet, Take 1/2-1 tablet by mouth 3 times daily as needed for dizziness., Disp: 60 tablet, Rfl: 1    methylPREDNISolone (MEDROL) 4 MG dose pack, Take as directed on package instructions - 6 day taper., Disp: 21 tablet, Rfl: 0    metoprolol tartrate (LOPRESSOR) 50 MG tablet, Take 1 tablet by mouth 1 (One) Time for 1 dose. 3 hours prior to CT scan, Disp: 1 tablet, Rfl: 0    pseudoephedrine (Sudafed 12 Hour) 120 MG 12 hr tablet, Take 1 tablet by mouth Every 12 (Twelve) Hours., Disp: 20 tablet, Rfl: 0    Allergies:   Allergies   Allergen Reactions    Niacin     Niacin And Related        Objective       Vital Signs:   Vitals:    01/18/24 1519   BP: 140/82   BP Location: Left arm   Patient Position: Sitting   Cuff Size: Adult   Pulse: 76   Resp: 20   Temp: 97 °F (36.1 °C)   SpO2: 97%   Weight: 103 kg (227 lb)   Height: 182.9 cm (72\")     Body mass index is 30.79 kg/m².    Physical Exam:  Physical Exam  Constitutional:       General: He is not in acute distress.     Appearance: Normal appearance. He is not ill-appearing.   HENT:      Right Ear: Ear canal normal.      Left Ear: Ear canal normal.      Ears:      Comments: Bilateral serous effusion     Nose: No congestion or rhinorrhea.      Mouth/Throat:      Mouth: Mucous membranes are moist.      Pharynx: No oropharyngeal exudate.   Eyes:      Extraocular Movements: Extraocular movements intact.      Conjunctiva/sclera: Conjunctivae normal.      Pupils: Pupils are equal, round, and reactive to light.   Cardiovascular:      Rate and " Rhythm: Normal rate and regular rhythm.      Heart sounds: No murmur heard.  Pulmonary:      Effort: Pulmonary effort is normal. No respiratory distress.      Breath sounds: Normal breath sounds.   Abdominal:      General: Abdomen is flat. Bowel sounds are normal.      Palpations: Abdomen is soft.      Tenderness: There is no abdominal tenderness.   Musculoskeletal:      Right lower leg: No edema.      Left lower leg: No edema.   Skin:     General: Skin is warm and dry.      Findings: No rash.   Neurological:      General: No focal deficit present.      Mental Status: He is alert and oriented to person, place, and time. Mental status is at baseline.   Psychiatric:         Mood and Affect: Mood normal.         Behavior: Behavior normal.         Procedures    Results:       Assessment / Plan      Assessment/Plan:   Diagnoses and all orders for this visit:    1. Dizziness (Primary)  -     meclizine (ANTIVERT) 25 MG tablet; Take 1/2-1 tablet by mouth 3 times daily as needed for dizziness.  Dispense: 60 tablet; Refill: 1    2. Acute URI  -     guaiFENesin (Mucinex) 600 MG 12 hr tablet; Take 2 tablets by mouth 2 (Two) Times a Day.  Dispense: 30 tablet; Refill: 0  -     pseudoephedrine (Sudafed 12 Hour) 120 MG 12 hr tablet; Take 1 tablet by mouth Every 12 (Twelve) Hours.  Dispense: 20 tablet; Refill: 0  -     benzonatate (Tessalon Perles) 100 MG capsule; Take 1 capsule by mouth 3 (Three) Times a Day As Needed for Cough.  Dispense: 30 capsule; Refill: 1  -     methylPREDNISolone (MEDROL) 4 MG dose pack; Take as directed on package instructions - 6 day taper.  Dispense: 21 tablet; Refill: 0  -     meclizine (ANTIVERT) 25 MG tablet; Take 1/2-1 tablet by mouth 3 times daily as needed for dizziness.  Dispense: 60 tablet; Refill: 1                 Follow Up:   No follow-ups on file.    DO LUCY Cunha

## 2024-07-16 PROBLEM — K57.30 SIGMOID DIVERTICULOSIS: Status: ACTIVE | Noted: 2024-07-16

## 2024-07-18 PROBLEM — D36.9 TUBULAR ADENOMA: Status: ACTIVE | Noted: 2024-07-18

## 2024-08-19 ENCOUNTER — OFFICE VISIT (OUTPATIENT)
Dept: CARDIOLOGY | Facility: CLINIC | Age: 64
End: 2024-08-19
Payer: COMMERCIAL

## 2024-08-19 VITALS
SYSTOLIC BLOOD PRESSURE: 122 MMHG | BODY MASS INDEX: 30.61 KG/M2 | OXYGEN SATURATION: 98 % | WEIGHT: 226 LBS | DIASTOLIC BLOOD PRESSURE: 76 MMHG | HEART RATE: 61 BPM | HEIGHT: 72 IN

## 2024-08-19 DIAGNOSIS — E78.2 MIXED HYPERLIPIDEMIA: Primary | ICD-10-CM

## 2024-08-19 PROCEDURE — 99213 OFFICE O/P EST LOW 20 MIN: CPT | Performed by: INTERNAL MEDICINE

## 2024-08-19 NOTE — PROGRESS NOTES
[facility] Cardiology  Office visit  Glen Sousa  1960  [phone]  There is no work phone number on file.    VISIT DATE:  8/19/2024    PCP: Ender Ibrahim MD  [address]  [facility] [address]    CC:  Chief Complaint   Patient presents with    Mixed hyperlipidemia       Previous cardiac studies and procedures:  November 2023 exercise treadmill test    Patient did not endorse chest pain or shortness of breath (O2 saturation WNL on RA).    Expected exercise duration 8:40. Actual exercise duration 8:41. Test stopped due to calf pain/fatigue.    No ECG evidence of myocardial ischemia.    Findings consistent with a normal ECG stress test.    ASSESSMENT:   Diagnosis Plan   1. Mixed hyperlipidemia            PLAN:  Mixed hyperlipidemia: LDL goal less than 100.  Continue atorvastatin 20 mg p.o. daily.  Underlying mildly depressed HDL and moderately elevated triglycerides.  Recommending trial of dietary and lifestyle modification to achieve weight loss before titrating medical therapy with addition of fenofibrate.  Repeat lipid profile pending prior to Follow-up with primary care physician.    Metabolic syndrome: Discussed dietary and lifestyle modifications to achieve weight loss.      Prediabetes    Subjective  Interval assessment: No change in baseline functional capacity.  Exercising on a regular basis without difficulty.  Blood pressures running less than 130/80 mmHg.    Initial evaluation: 63-year-old gentleman with history of hyperlipidemia, developing underlying metabolic syndrome with prediabetes, low HDL, hypertriglyceridemia, and central obesity. Denies chest discomfort or palpitations. Blood pressures typically run less than 135/80 mmHg. He is compliant with medical therapy. Does notice some shortness of breath and class II pattern with such activities as going up more than a flight of stairs. No family history of early CAD or stroke. Has begun dietary and  "lifestyle modifications to achieve weight loss.     PHYSICAL EXAMINATION:  Vitals:    08/19/24 1011   BP: 122/76   BP Location: Right arm   Patient Position: Sitting   Cuff Size: Adult   Pulse: 61   SpO2: 98%   Weight: 103 kg (226 lb)   Height: 182.9 cm (72.01\")     General Appearance:    Alert, cooperative, no distress, appears stated age   Head:    Normocephalic, without obvious abnormality, atraumatic   Eyes:    conjunctiva/corneas clear   Nose:   Nares normal, septum midline, mucosa normal, no drainage   Throat:   Lips, teeth and gums normal   Neck:   Supple, symmetrical, trachea midline, no carotid    bruit or JVD   Lungs:     Clear to auscultation bilaterally, respirations unlabored   Chest Wall:    No tenderness or deformity    Heart:    Regular rate and rhythm, S1 and S2 normal, no murmur, rub   or gallop, normal carotid impulse bilaterally without bruit.   Abdomen:     Soft, non-tender   Extremities:   Extremities normal, atraumatic, no cyanosis or edema   Pulses:   2+ and symmetric all extremities   Skin:   Skin color, texture, turgor normal, no rashes or lesions       Diagnostic Data:  Procedures  Lab Results   Component Value Date    CHLPL 233 (H) 01/19/2016    TRIG 299 (H) 10/23/2023    HDL 36 (L) 10/23/2023     Lab Results   Component Value Date    GLUCOSE 97 10/23/2023    BUN 12 10/23/2023    CREATININE 0.96 10/23/2023     10/23/2023    K 4.3 10/23/2023     10/23/2023    CO2 26.4 10/23/2023     Lab Results   Component Value Date    HGBA1C 5.80 (H) 10/23/2023     Lab Results   Component Value Date    WBC 5.97 10/23/2023    HGB 17.1 10/23/2023    HCT 49.0 10/23/2023     10/23/2023       Allergies  Allergies   Allergen Reactions    Niacin     Niacin And Related        Current Medications    Current Outpatient Medications:     atorvastatin (LIPITOR) 20 MG tablet, TAKE ONE (1) TABLET BY MOUTH NEVERY NIGHT, Disp: 90 tablet, Rfl: 1    benzonatate (Tessalon Perles) 100 MG capsule, Take 1 " capsule by mouth 3 (Three) Times a Day As Needed for Cough., Disp: 30 capsule, Rfl: 1    guaiFENesin (Mucinex) 600 MG 12 hr tablet, Take 2 tablets by mouth 2 (Two) Times a Day., Disp: 30 tablet, Rfl: 0    meclizine (ANTIVERT) 25 MG tablet, Take 1/2-1 tablet by mouth 3 times daily as needed for dizziness., Disp: 60 tablet, Rfl: 1    methylPREDNISolone (MEDROL) 4 MG dose pack, Take as directed on package instructions - 6 day taper., Disp: 21 tablet, Rfl: 0    pseudoephedrine (Sudafed 12 Hour) 120 MG 12 hr tablet, Take 1 tablet by mouth Every 12 (Twelve) Hours., Disp: 20 tablet, Rfl: 0    sildenafil (REVATIO) 20 MG tablet, Take 1 tablet by mouth 3 (Three) Times a Day. Take 2-4 as needed, Disp: 90 tablet, Rfl: 2    metoprolol tartrate (LOPRESSOR) 50 MG tablet, Take 1 tablet by mouth 1 (One) Time for 1 dose. 3 hours prior to CT scan, Disp: 1 tablet, Rfl: 0          ROS  ROS      SOCIAL HX  Social History     Socioeconomic History    Marital status:    Tobacco Use    Smoking status: Never    Smokeless tobacco: Never   Vaping Use    Vaping status: Never Used   Substance and Sexual Activity    Alcohol use: Yes     Comment: Social drinker    Drug use: Never    Sexual activity: Yes     Partners: Female       FAMILY HX  Family History   Problem Relation Age of Onset    Diabetes Mother     Cancer Sister         2x breast cancer    Diabetes Other     Hypertension Other     Hyperlipidemia Other     Cancer Other     Stroke Other     Arthritis Other     Graves' disease Other     Diverticulosis Other         of intestine    Breast cancer Other              Cosmo Mas III, MD, FACC

## 2024-08-23 RX ORDER — ATORVASTATIN CALCIUM 20 MG/1
TABLET, FILM COATED ORAL
Qty: 90 TABLET | Refills: 1 | Status: SHIPPED | OUTPATIENT
Start: 2024-08-23

## 2024-08-27 RX ORDER — ATORVASTATIN CALCIUM 20 MG/1
20 TABLET, FILM COATED ORAL NIGHTLY
Qty: 90 TABLET | Refills: 1 | Status: SHIPPED | OUTPATIENT
Start: 2024-08-27 | End: 2024-08-29 | Stop reason: SDUPTHER

## 2024-08-27 NOTE — TELEPHONE ENCOUNTER
Caller: Glen Sousa    Relationship: Self    Best call back number: [phone]    Requested Prescriptions:   Requested Prescriptions     Pending Prescriptions Disp Refills    atorvastatin (LIPITOR) 20 MG tablet 90 tablet 1        Pharmacy where request should be sent: [id number] [address] - [phone]  - [phone] FX     Last office visit with prescribing clinician: 10/24/2023   Last telemedicine visit with prescribing clinician: Visit date not found   Next office visit with prescribing clinician: Visit date not found     Additional details provided by patient: PT HAS ONE PILL LEFT    Does the patient have less than a 3 day supply:  [x] Yes  [] No    Would you like a call back once the refill request has been completed: [x] Yes [] No    If the office needs to give you a call back, can they leave a voicemail: [x] Yes [] No    Cesar Ken Rep   08/27/24 09:35 EDT

## 2024-08-29 ENCOUNTER — TELEPHONE (OUTPATIENT)
Dept: INTERNAL MEDICINE | Facility: CLINIC | Age: 64
End: 2024-08-29
Payer: COMMERCIAL

## 2024-08-29 RX ORDER — ATORVASTATIN CALCIUM 20 MG/1
20 TABLET, FILM COATED ORAL NIGHTLY
Qty: 90 TABLET | Refills: 1 | Status: SHIPPED | OUTPATIENT
Start: 2024-08-29

## 2024-08-29 NOTE — TELEPHONE ENCOUNTER
Provider: DR LEIJA    Caller: REDDY RÍOS    Relationship to Patient: SELF    Pharmacy: [facility]    Phone Number: [phone]     Reason for Call: THE PATIENT HAS REQUESTED FOR PCP TO SEND HIS ATORVASTATIN TO [facility] AS THEY ACCEPT HIS VOUCHER FOR MEDICATIONS.

## 2024-09-19 ENCOUNTER — HOSPITAL ENCOUNTER (OUTPATIENT)
Facility: HOSPITAL | Age: 64
Discharge: HOME | End: 2024-09-19
Admitting: INTERNAL MEDICINE
Payer: COMMERCIAL

## 2024-09-19 ENCOUNTER — TELEPHONE (OUTPATIENT)
Dept: INTERNAL MEDICINE | Facility: CLINIC | Age: 64
End: 2024-09-19

## 2024-09-19 DIAGNOSIS — R05.1 ACUTE COUGH: Primary | ICD-10-CM

## 2024-09-19 PROCEDURE — 71046 X-RAY EXAM CHEST 2 VIEWS: CPT

## 2024-09-19 NOTE — TELEPHONE ENCOUNTER
Caller: Glen Sousa    Relationship: Self    Best call back number: [phone]     What is the best time to reach you: ANYTIME     Who are you requesting to speak with (clinical staff, provider,  specific staff member): PROVIDER    Do you know the name of the person who called: NA    What was the call regarding: PATIENT GRAND DA SILVA WAS DIAGNOSED WITH WHOPPING COUGH AFTER A FEW WEEKS OF BEING SICK. PATIENT HAS HAD A LINGERING COUGH AND WASNT SURE IF HE HAD IT OR WHAT HE COULD TO HELP. PLEASE CALL TO DISCUSS.     Is it okay if the provider responds through MyChart: NO

## 2024-11-19 ENCOUNTER — TELEPHONE (OUTPATIENT)
Dept: INTERNAL MEDICINE | Facility: CLINIC | Age: 64
End: 2024-11-19

## 2024-11-19 RX ORDER — SILDENAFIL CITRATE 20 MG/1
20 TABLET ORAL 3 TIMES DAILY
Qty: 90 TABLET | Refills: 2 | Status: SHIPPED | OUTPATIENT
Start: 2024-11-19

## 2024-11-19 NOTE — TELEPHONE ENCOUNTER
Caller: Glen Sousa    Relationship to patient: Self    Best call back number: [phone]     Patient is needing: PATIENT HAS BEEN SCHEDULED WITH ANOTHER PROVIDER FOR HIS YEARLY CHECKUP AS THE DATES THAT WERE AVAILABLE WITH ANOTHER PROVIDER WORKED BETTER FOR HIS SCHEDULE    PLEASE BE ADVISED

## 2024-11-22 ENCOUNTER — OFFICE VISIT (OUTPATIENT)
Dept: INTERNAL MEDICINE | Facility: CLINIC | Age: 64
End: 2024-11-22
Payer: COMMERCIAL

## 2024-11-22 VITALS
DIASTOLIC BLOOD PRESSURE: 84 MMHG | HEIGHT: 72 IN | SYSTOLIC BLOOD PRESSURE: 122 MMHG | TEMPERATURE: 96.9 F | HEART RATE: 78 BPM | OXYGEN SATURATION: 98 % | WEIGHT: 232.6 LBS | BODY MASS INDEX: 31.51 KG/M2

## 2024-11-22 DIAGNOSIS — Z00.00 HEALTH CARE MAINTENANCE: Primary | ICD-10-CM

## 2024-11-22 DIAGNOSIS — B96.89 ACUTE BACTERIAL SINUSITIS: ICD-10-CM

## 2024-11-22 DIAGNOSIS — J01.90 ACUTE BACTERIAL SINUSITIS: ICD-10-CM

## 2024-11-22 DIAGNOSIS — Z00.00 ANNUAL PHYSICAL EXAM: Primary | ICD-10-CM

## 2024-11-22 LAB
25(OH)D3 SERPL-MCNC: 29.3 NG/ML (ref 30–100)
ALBUMIN SERPL-MCNC: 3.9 G/DL (ref 3.5–5.2)
ALBUMIN/GLOB SERPL: 1.2 G/DL
ALP SERPL-CCNC: 83 U/L (ref 39–117)
ALT SERPL W P-5'-P-CCNC: 22 U/L (ref 1–41)
ANION GAP SERPL CALCULATED.3IONS-SCNC: 11.4 MMOL/L (ref 5–15)
AST SERPL-CCNC: 20 U/L (ref 1–40)
BILIRUB SERPL-MCNC: 0.9 MG/DL (ref 0–1.2)
BUN SERPL-MCNC: 16 MG/DL (ref 8–23)
BUN/CREAT SERPL: 16 (ref 7–25)
CALCIUM SPEC-SCNC: 9.3 MG/DL (ref 8.6–10.5)
CHLORIDE SERPL-SCNC: 103 MMOL/L (ref 98–107)
CHOLEST SERPL-MCNC: 164 MG/DL (ref 0–200)
CO2 SERPL-SCNC: 24.6 MMOL/L (ref 22–29)
CREAT SERPL-MCNC: 1 MG/DL (ref 0.76–1.27)
DEPRECATED RDW RBC AUTO: 43.9 FL (ref 37–54)
EGFRCR SERPLBLD CKD-EPI 2021: 85.1 ML/MIN/1.73
ERYTHROCYTE [DISTWIDTH] IN BLOOD BY AUTOMATED COUNT: 12.7 % (ref 12.3–15.4)
FOLATE SERPL-MCNC: >20 NG/ML (ref 4.78–24.2)
GLOBULIN UR ELPH-MCNC: 3.3 GM/DL
GLUCOSE SERPL-MCNC: 121 MG/DL (ref 65–99)
HBA1C MFR BLD: 5.9 % (ref 4.8–5.6)
HCT VFR BLD AUTO: 48.2 % (ref 37.5–51)
HCV AB SER QL: NORMAL
HDLC SERPL-MCNC: 32 MG/DL (ref 40–60)
HGB BLD-MCNC: 17 G/DL (ref 13–17.7)
LDLC SERPL CALC-MCNC: 105 MG/DL (ref 0–100)
LDLC/HDLC SERPL: 3.18 {RATIO}
MCH RBC QN AUTO: 33.5 PG (ref 26.6–33)
MCHC RBC AUTO-ENTMCNC: 35.3 G/DL (ref 31.5–35.7)
MCV RBC AUTO: 94.9 FL (ref 79–97)
PLATELET # BLD AUTO: 175 10*3/MM3 (ref 140–450)
PMV BLD AUTO: 11 FL (ref 6–12)
POTASSIUM SERPL-SCNC: 4.2 MMOL/L (ref 3.5–5.2)
PROT SERPL-MCNC: 7.2 G/DL (ref 6–8.5)
RBC # BLD AUTO: 5.08 10*6/MM3 (ref 4.14–5.8)
SODIUM SERPL-SCNC: 139 MMOL/L (ref 136–145)
T4 FREE SERPL-MCNC: 1.04 NG/DL (ref 0.92–1.68)
TRIGL SERPL-MCNC: 152 MG/DL (ref 0–150)
TSH SERPL DL<=0.05 MIU/L-ACNC: 5 UIU/ML (ref 0.27–4.2)
VIT B12 BLD-MCNC: 622 PG/ML (ref 211–946)
VLDLC SERPL-MCNC: 27 MG/DL (ref 5–40)
WBC NRBC COR # BLD AUTO: 7.06 10*3/MM3 (ref 3.4–10.8)

## 2024-11-22 PROCEDURE — 99396 PREV VISIT EST AGE 40-64: CPT | Performed by: PHYSICIAN ASSISTANT

## 2024-11-22 PROCEDURE — 82306 VITAMIN D 25 HYDROXY: CPT | Performed by: PHYSICIAN ASSISTANT

## 2024-11-22 PROCEDURE — 80061 LIPID PANEL: CPT | Performed by: PHYSICIAN ASSISTANT

## 2024-11-22 PROCEDURE — 86803 HEPATITIS C AB TEST: CPT | Performed by: PHYSICIAN ASSISTANT

## 2024-11-22 PROCEDURE — 83036 HEMOGLOBIN GLYCOSYLATED A1C: CPT | Performed by: PHYSICIAN ASSISTANT

## 2024-11-22 PROCEDURE — 80053 COMPREHEN METABOLIC PANEL: CPT | Performed by: PHYSICIAN ASSISTANT

## 2024-11-22 PROCEDURE — 84443 ASSAY THYROID STIM HORMONE: CPT | Performed by: PHYSICIAN ASSISTANT

## 2024-11-22 PROCEDURE — 85027 COMPLETE CBC AUTOMATED: CPT | Performed by: PHYSICIAN ASSISTANT

## 2024-11-22 PROCEDURE — 84439 ASSAY OF FREE THYROXINE: CPT | Performed by: PHYSICIAN ASSISTANT

## 2024-11-22 PROCEDURE — 82607 VITAMIN B-12: CPT | Performed by: PHYSICIAN ASSISTANT

## 2024-11-22 PROCEDURE — 82746 ASSAY OF FOLIC ACID SERUM: CPT | Performed by: PHYSICIAN ASSISTANT

## 2024-11-22 RX ORDER — EZETIMIBE 10 MG/1
10 TABLET ORAL DAILY
Qty: 90 TABLET | Refills: 1 | Status: SHIPPED | OUTPATIENT
Start: 2024-11-22

## 2024-11-22 RX ORDER — ATORVASTATIN CALCIUM 20 MG/1
20 TABLET, FILM COATED ORAL NIGHTLY
Qty: 90 TABLET | Refills: 1 | Status: SHIPPED | OUTPATIENT
Start: 2024-11-22

## 2024-11-22 NOTE — PROGRESS NOTES
[facility]  [address]  [facility]ly [facility] [address]  Dept: [phone]  Dept Fax: [phone]  Loc: [phone]  Loc Fax: [phone]    Glen Sousa  1960    Follow Up Office Visit Note    History of Present Illness:  Pt is a 63 y/o male in today for annual physical. Also has a sinus infection.        The following portions of the patient's history were reviewed and updated as appropriate: allergies, current medications, past family history, past medical history, past social history, past surgical history, and problem list.    Medications:    Current Outpatient Medications:     atorvastatin (LIPITOR) 20 MG tablet, Take 1 tablet by mouth Every Night., Disp: 90 tablet, Rfl: 1    meclizine (ANTIVERT) 25 MG tablet, Take 1/2-1 tablet by mouth 3 times daily as needed for dizziness., Disp: 60 tablet, Rfl: 1    sildenafil (REVATIO) 20 MG tablet, TAKE 1 TABLET BY MOUTH 3 (THREE) TIMES A DAY. TAKE 2-4 AS NEEDED, Disp: 90 tablet, Rfl: 2    amoxicillin-clavulanate (AUGMENTIN) 875-125 MG per tablet, Take 1 tablet by mouth 2 (Two) Times a Day for 10 days. Take with food., Disp: 20 tablet, Rfl: 0    metoprolol tartrate (LOPRESSOR) 50 MG tablet, Take 1 tablet by mouth 1 (One) Time for 1 dose. 3 hours prior to CT scan, Disp: 1 tablet, Rfl: 0    Subjective  Allergies   Allergen Reactions    Niacin     Niacin And Related         Past Medical History:   Diagnosis Date    Acute sinusitis     Getting over sinus infection. Symptoms better with augmentin.    Elbow enthesopathy     Hyperlipoproteinemia type IV     Overweight     Plantar fasciitis     Vitamin D deficiency        Past Surgical History:   Procedure Laterality Date    LASIK      SINUS SURGERY  02/28/2023       Family History   Problem Relation Age of Onset    Diabetes Mother     Cancer Sister         2x breast cancer    Diabetes Other     Hypertension Other     Hyperlipidemia Other     Cancer Other     Stroke  "Other     Arthritis Other     Graves' disease Other     Diverticulosis Other         of intestine    Breast cancer Other         Social History     Socioeconomic History    Marital status:    Tobacco Use    Smoking status: Never    Smokeless tobacco: Never   Vaping Use    Vaping status: Never Used   Substance and Sexual Activity    Alcohol use: Yes     Comment: Social drinker    Drug use: Never    Sexual activity: Yes     Partners: Female       Review of Systems   Constitutional:  Negative for activity change, chills, fatigue, fever and unexpected weight change.   HENT:  Positive for congestion, postnasal drip, sinus pressure, sinus pain and sore throat. Negative for ear pain.    Eyes:  Negative for pain, discharge and redness.   Respiratory:  Positive for cough. Negative for shortness of breath and wheezing.    Cardiovascular:  Negative for chest pain, palpitations and leg swelling.   Gastrointestinal:  Negative for diarrhea, nausea and vomiting.   Endocrine: Negative for cold intolerance and heat intolerance.   Genitourinary:  Negative for decreased urine volume and dysuria.   Musculoskeletal:  Negative for arthralgias and myalgias.   Skin:  Negative for rash and wound.   Neurological:  Negative for dizziness, light-headedness and headaches.   Hematological:  Does not bruise/bleed easily.   Psychiatric/Behavioral:  Negative for confusion, dysphoric mood and sleep disturbance. The patient is not nervous/anxious.          Objective  Vitals:    11/22/24 0903   BP: 122/84   BP Location: Left arm   Patient Position: Sitting   Cuff Size: Large Adult   Pulse: 78   Temp: 96.9 °F (36.1 °C)   TempSrc: Temporal   SpO2: 98%   Weight: 106 kg (232 lb 9.6 oz)   Height: 182.9 cm (72.01\")     Body mass index is 31.54 kg/m².     Physical Exam  Physical Exam  Vitals and nursing note reviewed.   Constitutional:       General: He is not in acute distress.     Appearance: He is not ill-appearing.   HENT:      Head: " Normocephalic.      Right Ear: Tympanic membrane, ear canal and external ear normal. There is no impacted cerumen.      Left Ear: Tympanic membrane, ear canal and external ear normal. There is no impacted cerumen.      Nose: No congestion or rhinorrhea.      Mouth/Throat:      Mouth: Mucous membranes are moist.      Pharynx: Oropharynx is clear. No oropharyngeal exudate or posterior oropharyngeal erythema.   Eyes:      General:         Right eye: No discharge.         Left eye: No discharge.      Extraocular Movements: Extraocular movements intact.      Conjunctiva/sclera: Conjunctivae normal.      Pupils: Pupils are equal, round, and reactive to light.   Cardiovascular:      Rate and Rhythm: Normal rate and regular rhythm.      Heart sounds: Normal heart sounds. No murmur heard.     No friction rub. No gallop.   Pulmonary:      Effort: Pulmonary effort is normal. No respiratory distress.      Breath sounds: Normal breath sounds. No wheezing.   Abdominal:      General: Bowel sounds are normal. There is no distension.      Palpations: Abdomen is soft. There is no mass.      Tenderness: There is no abdominal tenderness.   Musculoskeletal:         General: No swelling. Normal range of motion.      Cervical back: Normal range of motion. No tenderness.      Right lower leg: No edema.      Left lower leg: No edema.   Lymphadenopathy:      Cervical: No cervical adenopathy.   Skin:     Findings: No bruising, erythema or rash.   Neurological:      Mental Status: He is oriented to person, place, and time.      Gait: Gait normal.   Psychiatric:         Mood and Affect: Mood normal.         Behavior: Behavior normal.         Thought Content: Thought content normal.         Judgment: Judgment normal.         Diagnostic Data  Procedures    Assessment  Diagnoses and all orders for this visit:    1. Annual physical exam (Primary)  -     Vitamin B12 & Folate  -     Vitamin D,25-Hydroxy; Future  -     Lipid Panel  -     Hemoglobin  A1c; Future  -     TSH Rfx On Abnormal To Free T4  -     Comprehensive Metabolic Panel  -     CBC (No Diff)  -     Hepatitis C Antibody    2. Acute bacterial sinusitis    Other orders  -     atorvastatin (LIPITOR) 20 MG tablet; Take 1 tablet by mouth Every Night.  Dispense: 90 tablet; Refill: 1  -     amoxicillin-clavulanate (AUGMENTIN) 875-125 MG per tablet; Take 1 tablet by mouth 2 (Two) Times a Day for 10 days. Take with food.  Dispense: 20 tablet; Refill: 0        Plan    1. Annual physical exam (Primary)- ordered fasting labs and follow up with these. Advised on nutrition and exercise and follow up with this.    2. Acute bacterial sinusitis- augmentin 875 mg bid x 10 days. Advised to take with food and probiotics to prevent GI upset.      Return in about 3 months (around 2/22/2025) for Recheck w/ Dr. Ibrahim..    Damian Godwin PA-C  11/22/2024

## 2024-12-18 ENCOUNTER — OFFICE VISIT (OUTPATIENT)
Dept: INTERNAL MEDICINE | Facility: CLINIC | Age: 64
End: 2024-12-18
Payer: COMMERCIAL

## 2024-12-18 ENCOUNTER — TELEPHONE (OUTPATIENT)
Dept: INTERNAL MEDICINE | Facility: CLINIC | Age: 64
End: 2024-12-18
Payer: COMMERCIAL

## 2024-12-18 VITALS
HEIGHT: 72 IN | SYSTOLIC BLOOD PRESSURE: 126 MMHG | TEMPERATURE: 97.8 F | WEIGHT: 237 LBS | OXYGEN SATURATION: 99 % | BODY MASS INDEX: 32.1 KG/M2 | DIASTOLIC BLOOD PRESSURE: 70 MMHG | HEART RATE: 76 BPM

## 2024-12-18 DIAGNOSIS — J06.9 ACUTE URI: Primary | ICD-10-CM

## 2024-12-18 DIAGNOSIS — J01.90 ACUTE BACTERIAL SINUSITIS: ICD-10-CM

## 2024-12-18 DIAGNOSIS — B96.89 ACUTE BACTERIAL SINUSITIS: ICD-10-CM

## 2024-12-18 PROCEDURE — 99213 OFFICE O/P EST LOW 20 MIN: CPT | Performed by: INTERNAL MEDICINE

## 2024-12-18 RX ORDER — PSEUDOEPHEDRINE HCL 120 MG/1
120 TABLET, FILM COATED, EXTENDED RELEASE ORAL EVERY 12 HOURS
Qty: 30 TABLET | Refills: 3 | Status: SHIPPED | OUTPATIENT
Start: 2024-12-18

## 2024-12-18 RX ORDER — GUAIFENESIN 600 MG/1
1200 TABLET, EXTENDED RELEASE ORAL 2 TIMES DAILY
Qty: 30 TABLET | Refills: 0 | Status: CANCELLED | OUTPATIENT
Start: 2024-12-18

## 2024-12-18 RX ORDER — AZELASTINE HCL 205.5 UG/1
2 SPRAY NASAL DAILY
Qty: 1 EACH | Refills: 5 | Status: SHIPPED | OUTPATIENT
Start: 2024-12-18

## 2024-12-18 RX ORDER — METHYLPREDNISOLONE 4 MG/1
TABLET ORAL
Qty: 21 TABLET | Refills: 0 | Status: SHIPPED | OUTPATIENT
Start: 2024-12-18 | End: 2024-12-18

## 2024-12-18 RX ORDER — PSEUDOEPHEDRINE HCL 120 MG/1
120 TABLET, FILM COATED, EXTENDED RELEASE ORAL EVERY 12 HOURS
Qty: 30 TABLET | Refills: 0 | Status: CANCELLED | OUTPATIENT
Start: 2024-12-18

## 2024-12-18 RX ORDER — METHYLPREDNISOLONE 4 MG/1
TABLET ORAL
Qty: 21 TABLET | Refills: 2 | Status: SHIPPED | OUTPATIENT
Start: 2024-12-18

## 2024-12-18 RX ORDER — AZITHROMYCIN 500 MG/1
500 TABLET, FILM COATED ORAL DAILY
Qty: 5 TABLET | Refills: 0 | Status: SHIPPED | OUTPATIENT
Start: 2024-12-18

## 2024-12-18 RX ORDER — BENZONATATE 100 MG/1
100 CAPSULE ORAL 3 TIMES DAILY PRN
Qty: 30 CAPSULE | Refills: 1 | Status: CANCELLED | OUTPATIENT
Start: 2024-12-18

## 2024-12-18 NOTE — TELEPHONE ENCOUNTER
Caller: Glen Sousa    Relationship: Self    Best call back number: [phone]     What medication are you requesting: STEROID    What are your current symptoms: DRAINAGE, SORE THROAT, COUGHING, HEADACHES    How long have you been experiencing symptoms: 11/22/24    Have you had these symptoms before:    [x] Yes  [] No    Have you been treated for these symptoms before:   [x] Yes  [] No    If a prescription is needed, what is your preferred pharmacy and phone number: [facility] [address] - [phone] [facility] [phone]      Additional notes: PATIENT IS WILLING TO BE SEEN IF NEEDED.

## 2024-12-18 NOTE — PROGRESS NOTES
Follow Up Office Visit      Date: 2024   Patient Name: Glen Sousa  : 1960   MRN: [id number]     Chief Complaint:    Chief Complaint   Patient presents with    Cough    Nasal Congestion     Has had for 3 weeks, took a course of antibiotics never completely went away       History of Present Illness: Glen Sousa is a 64 y.o. male who is here today to follow up with cough and nasal congestion.  Completed abx, but didn't clear symptoms.      Sinus pain and headache.   Cough slightly productive. Taking zyrtec and flonase.        Subjective      Past Medical History:   Diagnosis Date    Acute sinusitis     Getting over sinus infection. Symptoms better with augmentin.    Elbow enthesopathy     Hyperlipoproteinemia type IV     Overweight     Plantar fasciitis     Vitamin D deficiency        Past Surgical History:   Procedure Laterality Date    LASIK      SINUS SURGERY  2023       Family History   Problem Relation Age of Onset    Diabetes Mother     Cancer Sister         2x breast cancer    Diabetes Other     Hypertension Other     Hyperlipidemia Other     Cancer Other     Stroke Other     Arthritis Other     Graves' disease Other     Diverticulosis Other         of intestine    Breast cancer Other         Social History     Socioeconomic History    Marital status:    Tobacco Use    Smoking status: Never    Smokeless tobacco: Never   Vaping Use    Vaping status: Never Used   Substance and Sexual Activity    Alcohol use: Yes     Comment: Social drinker    Drug use: Never    Sexual activity: Yes     Partners: Female         I have reviewed the patients family history, social history, past medical history, past surgical history and have updated it as appropriate.     Medications:     Current Outpatient Medications:     atorvastatin (LIPITOR) 20 MG tablet, Take 1 tablet by mouth Every Night., Disp: 90 tablet, Rfl: 1    ezetimibe (Zetia) 10 MG tablet, Take 1 tablet by mouth Daily., Disp:  "90 tablet, Rfl: 1    sildenafil (REVATIO) 20 MG tablet, TAKE 1 TABLET BY MOUTH 3 (THREE) TIMES A DAY. TAKE 2-4 AS NEEDED, Disp: 90 tablet, Rfl: 2    Allergies:   Allergies   Allergen Reactions    Niacin     Niacin And Related        Objective       Vital Signs:   Vitals:    12/18/24 1139   BP: 126/70   Pulse: 76   Temp: 97.8 °F (36.6 °C)   TempSrc: Tympanic   SpO2: 99%   Weight: 108 kg (237 lb)   Height: 182.9 cm (72\")     Body mass index is 32.14 kg/m².    Physical Exam:  Physical Exam  Constitutional:       General: He is not in acute distress.     Appearance: Normal appearance. He is not ill-appearing.   HENT:      Right Ear: Tympanic membrane normal.      Left Ear: Tympanic membrane and ear canal normal.      Nose: Congestion present. No rhinorrhea.      Mouth/Throat:      Mouth: Mucous membranes are moist.      Pharynx: No oropharyngeal exudate.   Eyes:      Extraocular Movements: Extraocular movements intact.      Conjunctiva/sclera: Conjunctivae normal.      Pupils: Pupils are equal, round, and reactive to light.   Cardiovascular:      Rate and Rhythm: Normal rate and regular rhythm.      Heart sounds: No murmur heard.  Pulmonary:      Effort: Pulmonary effort is normal. No respiratory distress.   Abdominal:      General: Abdomen is flat. Bowel sounds are normal.      Palpations: Abdomen is soft.      Tenderness: There is no abdominal tenderness.   Musculoskeletal:      Right lower leg: No edema.      Left lower leg: No edema.   Skin:     General: Skin is warm and dry.      Findings: No rash.   Neurological:      General: No focal deficit present.      Mental Status: He is alert and oriented to person, place, and time. Mental status is at baseline.   Psychiatric:         Mood and Affect: Mood normal.         Behavior: Behavior normal.         Procedures    Results:       Assessment / Plan      Assessment/Plan:   Diagnoses and all orders for this visit:    1. Acute URI (Primary)    2. Acute bacterial " sinusitis                 Follow Up:   No follow-ups on file.    DO LUCY Cunha

## 2024-12-27 DIAGNOSIS — J32.9 CHRONIC SINUSITIS, UNSPECIFIED LOCATION: Primary | ICD-10-CM

## 2024-12-31 ENCOUNTER — TELEPHONE (OUTPATIENT)
Dept: INTERNAL MEDICINE | Facility: CLINIC | Age: 64
End: 2024-12-31

## 2024-12-31 NOTE — TELEPHONE ENCOUNTER
Pt called per a [employer] message about sending current CT orders to:       [facility] on [address], fax [phone].    Looked like we might not have sent these over to them.

## 2025-02-21 ENCOUNTER — OFFICE VISIT (OUTPATIENT)
Dept: INTERNAL MEDICINE | Facility: CLINIC | Age: 65
End: 2025-02-21
Payer: COMMERCIAL

## 2025-02-21 ENCOUNTER — TELEPHONE (OUTPATIENT)
Dept: INTERNAL MEDICINE | Facility: CLINIC | Age: 65
End: 2025-02-21

## 2025-02-21 ENCOUNTER — LAB (OUTPATIENT)
Dept: INTERNAL MEDICINE | Facility: CLINIC | Age: 65
End: 2025-02-21
Payer: COMMERCIAL

## 2025-02-21 VITALS
OXYGEN SATURATION: 98 % | DIASTOLIC BLOOD PRESSURE: 80 MMHG | HEART RATE: 75 BPM | WEIGHT: 240 LBS | SYSTOLIC BLOOD PRESSURE: 130 MMHG | HEIGHT: 72 IN | BODY MASS INDEX: 32.51 KG/M2

## 2025-02-21 DIAGNOSIS — J30.1 SEASONAL ALLERGIC RHINITIS DUE TO POLLEN: Primary | ICD-10-CM

## 2025-02-21 DIAGNOSIS — E66.811 CLASS 1 OBESITY DUE TO EXCESS CALORIES WITH SERIOUS COMORBIDITY AND BODY MASS INDEX (BMI) OF 32.0 TO 32.9 IN ADULT: ICD-10-CM

## 2025-02-21 DIAGNOSIS — D36.9 TUBULAR ADENOMA: ICD-10-CM

## 2025-02-21 DIAGNOSIS — G47.33 OSA ON CPAP: ICD-10-CM

## 2025-02-21 DIAGNOSIS — R73.09 ABNORMAL GLUCOSE: ICD-10-CM

## 2025-02-21 DIAGNOSIS — Z12.5 SCREENING FOR PROSTATE CANCER: ICD-10-CM

## 2025-02-21 DIAGNOSIS — E66.09 CLASS 1 OBESITY DUE TO EXCESS CALORIES WITH SERIOUS COMORBIDITY AND BODY MASS INDEX (BMI) OF 32.0 TO 32.9 IN ADULT: ICD-10-CM

## 2025-02-21 DIAGNOSIS — E78.2 MIXED HYPERLIPIDEMIA: ICD-10-CM

## 2025-02-21 DIAGNOSIS — Z00.00 ROUTINE GENERAL MEDICAL EXAMINATION AT A HEALTH CARE FACILITY: Primary | ICD-10-CM

## 2025-02-21 DIAGNOSIS — J32.9 CHRONIC SINUSITIS, UNSPECIFIED LOCATION: ICD-10-CM

## 2025-02-21 DIAGNOSIS — K57.30 SIGMOID DIVERTICULOSIS: ICD-10-CM

## 2025-02-21 LAB
ALBUMIN SERPL-MCNC: 4.3 G/DL (ref 3.5–5.2)
ALBUMIN/GLOB SERPL: 1.6 G/DL
ALP SERPL-CCNC: 82 U/L (ref 39–117)
ALT SERPL W P-5'-P-CCNC: 30 U/L (ref 1–41)
ANION GAP SERPL CALCULATED.3IONS-SCNC: 11.4 MMOL/L (ref 5–15)
AST SERPL-CCNC: 27 U/L (ref 1–40)
BILIRUB SERPL-MCNC: 0.7 MG/DL (ref 0–1.2)
BUN SERPL-MCNC: 12 MG/DL (ref 8–23)
BUN/CREAT SERPL: 13 (ref 7–25)
CALCIUM SPEC-SCNC: 9.3 MG/DL (ref 8.6–10.5)
CHLORIDE SERPL-SCNC: 104 MMOL/L (ref 98–107)
CHOLEST SERPL-MCNC: 145 MG/DL (ref 0–200)
CO2 SERPL-SCNC: 26.6 MMOL/L (ref 22–29)
CREAT SERPL-MCNC: 0.92 MG/DL (ref 0.76–1.27)
DEPRECATED RDW RBC AUTO: 45.4 FL (ref 37–54)
EGFRCR SERPLBLD CKD-EPI 2021: 94.1 ML/MIN/1.73
ERYTHROCYTE [DISTWIDTH] IN BLOOD BY AUTOMATED COUNT: 13.1 % (ref 12.3–15.4)
GLOBULIN UR ELPH-MCNC: 2.7 GM/DL
GLUCOSE SERPL-MCNC: 85 MG/DL (ref 65–99)
HBA1C MFR BLD: 6.1 % (ref 4.8–5.6)
HCT VFR BLD AUTO: 46 % (ref 37.5–51)
HDLC SERPL-MCNC: 44 MG/DL (ref 40–60)
HGB BLD-MCNC: 16.3 G/DL (ref 13–17.7)
LDLC SERPL CALC-MCNC: 83 MG/DL (ref 0–100)
LDLC/HDLC SERPL: 1.86 {RATIO}
MCH RBC QN AUTO: 33.5 PG (ref 26.6–33)
MCHC RBC AUTO-ENTMCNC: 35.4 G/DL (ref 31.5–35.7)
MCV RBC AUTO: 94.7 FL (ref 79–97)
PLATELET # BLD AUTO: 206 10*3/MM3 (ref 140–450)
PMV BLD AUTO: 10.8 FL (ref 6–12)
POTASSIUM SERPL-SCNC: 4.2 MMOL/L (ref 3.5–5.2)
PROT SERPL-MCNC: 7 G/DL (ref 6–8.5)
PSA SERPL-MCNC: 4.04 NG/ML (ref 0–4)
RBC # BLD AUTO: 4.86 10*6/MM3 (ref 4.14–5.8)
SODIUM SERPL-SCNC: 142 MMOL/L (ref 136–145)
TRIGL SERPL-MCNC: 95 MG/DL (ref 0–150)
TSH SERPL DL<=0.05 MIU/L-ACNC: 4.72 UIU/ML (ref 0.27–4.2)
VIT B12 BLD-MCNC: 555 PG/ML (ref 211–946)
VLDLC SERPL-MCNC: 18 MG/DL (ref 5–40)
WBC NRBC COR # BLD AUTO: 8.3 10*3/MM3 (ref 3.4–10.8)

## 2025-02-21 PROCEDURE — G0103 PSA SCREENING: HCPCS | Performed by: INTERNAL MEDICINE

## 2025-02-21 PROCEDURE — 83036 HEMOGLOBIN GLYCOSYLATED A1C: CPT | Performed by: INTERNAL MEDICINE

## 2025-02-21 PROCEDURE — 84443 ASSAY THYROID STIM HORMONE: CPT | Performed by: INTERNAL MEDICINE

## 2025-02-21 PROCEDURE — 82607 VITAMIN B-12: CPT | Performed by: INTERNAL MEDICINE

## 2025-02-21 PROCEDURE — 99214 OFFICE O/P EST MOD 30 MIN: CPT | Performed by: INTERNAL MEDICINE

## 2025-02-21 PROCEDURE — 85027 COMPLETE CBC AUTOMATED: CPT | Performed by: INTERNAL MEDICINE

## 2025-02-21 PROCEDURE — 80053 COMPREHEN METABOLIC PANEL: CPT | Performed by: INTERNAL MEDICINE

## 2025-02-21 PROCEDURE — 80061 LIPID PANEL: CPT | Performed by: INTERNAL MEDICINE

## 2025-02-21 RX ORDER — CEFDINIR 300 MG/1
300 CAPSULE ORAL 2 TIMES DAILY
Qty: 28 CAPSULE | Refills: 0 | Status: SHIPPED | OUTPATIENT
Start: 2025-02-21

## 2025-02-21 NOTE — PROGRESS NOTES
Patient is a 64 y.o. male who is here for a follow up of hyperlipidemia  Chief Complaint   Patient presents with    Hyperlipidemia         HPI:    Here for mgmt of chronic sinusitis.  Onset months.  Has to clear his throat regularly and discolored rhinorrhea.  Has a cough also, worst in am.  Has frontal pressure.  No CP.  No fever or chills or night sweats.    Tolerating meds.      History:     Patient Active Problem List   Diagnosis    Hyperlipidemia    Male erectile disorder    FARIDEH on CPAP    Allergic rhinitis    Arthritis    Vitamin D deficiency    Routine general medical examination at a health care facility    Tenderness of lumbar region    Sigmoid diverticulosis    Tubular adenoma    Class 1 obesity due to excess calories with serious comorbidity and body mass index (BMI) of 32.0 to 32.9 in adult       Past Medical History:   Diagnosis Date    Acute sinusitis     Getting over sinus infection. Symptoms better with augmentin.    Elbow enthesopathy     Hyperlipoproteinemia type IV     Overweight     Plantar fasciitis     Vitamin D deficiency        Past Surgical History:   Procedure Laterality Date    LASIK      SINUS SURGERY  02/28/2023       Current Outpatient Medications on File Prior to Visit   Medication Sig    atorvastatin (LIPITOR) 20 MG tablet Take 1 tablet by mouth Every Night.    azelastine (ASTEPRO) 0.15 % solution nasal spray Administer 2 sprays into the nostril(s) as directed by provider Daily.    ezetimibe (Zetia) 10 MG tablet Take 1 tablet by mouth Daily.    sildenafil (REVATIO) 20 MG tablet TAKE 1 TABLET BY MOUTH 3 (THREE) TIMES A DAY. TAKE 2-4 AS NEEDED    [DISCONTINUED] azithromycin (Zithromax) 500 MG tablet Take 1 tablet by mouth Daily.    [DISCONTINUED] methylPREDNISolone (MEDROL) 4 MG dose pack Take as directed on package instructions - 6 day taper. (Patient not taking: Reported on 2/21/2025)    [DISCONTINUED] pseudoephedrine (SUDAFED) 120 MG 12 hr tablet Take 1 tablet by mouth Every 12  (Twelve) Hours. (Patient not taking: Reported on 2/21/2025)     No current facility-administered medications on file prior to visit.       Family History   Problem Relation Age of Onset    Diabetes Mother     Cancer Sister         2x breast cancer    Diabetes Other     Hypertension Other     Hyperlipidemia Other     Cancer Other     Stroke Other     Arthritis Other     Graves' disease Other     Diverticulosis Other         of intestine    Breast cancer Other        Social History     Socioeconomic History    Marital status:    Tobacco Use    Smoking status: Never    Smokeless tobacco: Never   Vaping Use    Vaping status: Never Used   Substance and Sexual Activity    Alcohol use: Yes     Comment: Social drinker    Drug use: Never    Sexual activity: Yes     Partners: Female         Review of Systems   Constitutional:  Negative for chills, fatigue, fever and unexpected weight change.   HENT:  Positive for congestion, rhinorrhea and sinus pressure. Negative for ear pain, hearing loss, sore throat and trouble swallowing.    Eyes:  Negative for discharge and itching.   Respiratory:  Positive for cough. Negative for chest tightness and shortness of breath.    Cardiovascular:  Negative for chest pain, palpitations and leg swelling.   Gastrointestinal:  Negative for abdominal pain, blood in stool, constipation, diarrhea and vomiting.        12/13 colonoscopy normal  6/19 colonoscopy normal   Endocrine: Negative for polydipsia and polyuria.   Genitourinary:  Positive for difficulty urinating. Negative for dysuria, enuresis, frequency, hematuria and urgency.        10/23 psa   Musculoskeletal:  Positive for arthralgias and back pain. Negative for gait problem and joint swelling.   Skin:  Negative for rash and wound.   Allergic/Immunologic: Negative for immunocompromised state.   Neurological:  Negative for dizziness, syncope, weakness, light-headedness, numbness and headaches.   Hematological:  Does not bruise/bleed  "easily.   Psychiatric/Behavioral:  Negative for behavioral problems, dysphoric mood and sleep disturbance. The patient is not nervous/anxious.        /80   Pulse 75   Ht 182.9 cm (72.01\")   Wt 109 kg (240 lb)   SpO2 98%   BMI 32.54 kg/m²       Physical Exam  Constitutional:       Appearance: Normal appearance. He is well-developed.   HENT:      Head: Normocephalic and atraumatic.      Comments: Frontal tenderness     Right Ear: External ear normal.      Left Ear: External ear normal.      Nose: Nose normal.      Mouth/Throat:      Mouth: Mucous membranes are moist.      Pharynx: Oropharynx is clear.   Eyes:      Extraocular Movements: Extraocular movements intact.      Conjunctiva/sclera: Conjunctivae normal.      Pupils: Pupils are equal, round, and reactive to light.   Cardiovascular:      Rate and Rhythm: Normal rate and regular rhythm.      Pulses: Normal pulses.      Heart sounds: Normal heart sounds.   Pulmonary:      Effort: Pulmonary effort is normal.      Breath sounds: Normal breath sounds.   Abdominal:      General: Bowel sounds are normal.      Palpations: Abdomen is soft.   Genitourinary:     Comments: 2019 colonoscopy noted  Will refer to Urology sec to increased PSA  Musculoskeletal:         General: Normal range of motion.      Cervical back: Normal range of motion and neck supple.   Lymphadenopathy:      Cervical: No cervical adenopathy.   Skin:     General: Skin is warm and dry.   Neurological:      General: No focal deficit present.      Mental Status: He is alert and oriented to person, place, and time.   Psychiatric:         Mood and Affect: Mood normal.         Behavior: Behavior normal.         Thought Content: Thought content normal.         Procedure:      Historical Data:    hyperlipidemia-labs today, counseled on diet  tara-cont cpap, stable  rhinitis-advised compliance with flonase and prn allegra, stable  ED-sildenafil prn  Vit d def-recheck on target, cont " replacement  Elevated psa-recheck  Obesity with comorbid condition-advised of risk and benefits, will start GLP1     10/24 labs noted and dw patient    Current Outpatient Medications:     atorvastatin (LIPITOR) 20 MG tablet, Take 1 tablet by mouth Every Night., Disp: 90 tablet, Rfl: 1    azelastine (ASTEPRO) 0.15 % solution nasal spray, Administer 2 sprays into the nostril(s) as directed by provider Daily., Disp: 1 each, Rfl: 5    ezetimibe (Zetia) 10 MG tablet, Take 1 tablet by mouth Daily., Disp: 90 tablet, Rfl: 1    sildenafil (REVATIO) 20 MG tablet, TAKE 1 TABLET BY MOUTH 3 (THREE) TIMES A DAY. TAKE 2-4 AS NEEDED, Disp: 90 tablet, Rfl: 2    cefdinir (OMNICEF) 300 MG capsule, Take 1 capsule by mouth 2 (Two) Times a Day., Disp: 28 capsule, Rfl: 0        Diagnoses and all orders for this visit:    1. Seasonal allergic rhinitis due to pollen (Primary)    2. Mixed hyperlipidemia    3. Sigmoid diverticulosis    4. Tubular adenoma    5. FARIDEH on CPAP    6. Chronic sinusitis, unspecified location  -     cefdinir (OMNICEF) 300 MG capsule; Take 1 capsule by mouth 2 (Two) Times a Day.  Dispense: 28 capsule; Refill: 0    7. Class 1 obesity due to excess calories with serious comorbidity and body mass index (BMI) of 32.0 to 32.9 in adult

## 2025-02-21 NOTE — TELEPHONE ENCOUNTER
Caller: Glen Sousa    Relationship: Self    Best call back number:  [phone] (Mobile)     Who are you requesting to speak with (clinical staff, provider,  specific staff member): CLINICAL     What was the call regarding: PATIENT HAS AN APPOINTMENT TODAY AT 2:15 AND WANTS TO SEE IF HE CAN COME IN SOON TO DO HIS BLOOD WORK   SO HE DOESN'T HAVE TO FAST UNTIL THEN

## 2025-03-03 DIAGNOSIS — E66.09 CLASS 1 OBESITY DUE TO EXCESS CALORIES WITH SERIOUS COMORBIDITY AND BODY MASS INDEX (BMI) OF 32.0 TO 32.9 IN ADULT: Primary | ICD-10-CM

## 2025-03-03 DIAGNOSIS — E66.811 CLASS 1 OBESITY DUE TO EXCESS CALORIES WITH SERIOUS COMORBIDITY AND BODY MASS INDEX (BMI) OF 32.0 TO 32.9 IN ADULT: Primary | ICD-10-CM

## 2025-03-03 RX ORDER — ATORVASTATIN CALCIUM 20 MG/1
20 TABLET, FILM COATED ORAL NIGHTLY
Qty: 90 TABLET | Refills: 2 | Status: SHIPPED | OUTPATIENT
Start: 2025-03-03